# Patient Record
Sex: FEMALE | Race: WHITE | NOT HISPANIC OR LATINO | Employment: FULL TIME | URBAN - METROPOLITAN AREA
[De-identification: names, ages, dates, MRNs, and addresses within clinical notes are randomized per-mention and may not be internally consistent; named-entity substitution may affect disease eponyms.]

---

## 2020-09-21 ENCOUNTER — OFFICE VISIT (OUTPATIENT)
Dept: FAMILY MEDICINE CLINIC | Facility: CLINIC | Age: 35
End: 2020-09-21

## 2020-09-21 VITALS
TEMPERATURE: 98.2 F | OXYGEN SATURATION: 100 % | SYSTOLIC BLOOD PRESSURE: 128 MMHG | WEIGHT: 254 LBS | DIASTOLIC BLOOD PRESSURE: 74 MMHG | HEIGHT: 63 IN | BODY MASS INDEX: 45 KG/M2 | HEART RATE: 98 BPM

## 2020-09-21 DIAGNOSIS — M54.50 CHRONIC BILATERAL LOW BACK PAIN WITHOUT SCIATICA: ICD-10-CM

## 2020-09-21 DIAGNOSIS — M79.672 LEFT FOOT PAIN: Primary | ICD-10-CM

## 2020-09-21 DIAGNOSIS — G89.29 CHRONIC BILATERAL LOW BACK PAIN WITHOUT SCIATICA: ICD-10-CM

## 2020-09-21 DIAGNOSIS — G57.11 MERALGIA PARESTHETICA OF RIGHT SIDE: ICD-10-CM

## 2020-09-21 DIAGNOSIS — Z23 IMMUNIZATION DUE: ICD-10-CM

## 2020-09-21 PROBLEM — Z34.00 SUPERVISION OF NORMAL FIRST PREGNANCY, ANTEPARTUM: Status: ACTIVE | Noted: 2018-04-18

## 2020-09-21 PROBLEM — R10.9 LEFT FLANK PAIN: Status: ACTIVE | Noted: 2018-04-20

## 2020-09-21 PROCEDURE — 90471 IMMUNIZATION ADMIN: CPT | Performed by: NURSE PRACTITIONER

## 2020-09-21 PROCEDURE — 90686 IIV4 VACC NO PRSV 0.5 ML IM: CPT | Performed by: NURSE PRACTITIONER

## 2020-09-21 PROCEDURE — 99204 OFFICE O/P NEW MOD 45 MIN: CPT | Performed by: NURSE PRACTITIONER

## 2020-09-21 PROCEDURE — 73630 X-RAY EXAM OF FOOT: CPT | Performed by: NURSE PRACTITIONER

## 2020-09-21 RX ORDER — BUPROPION HYDROCHLORIDE 150 MG/1
150 TABLET ORAL DAILY
COMMUNITY
End: 2022-05-11

## 2020-09-21 NOTE — PROGRESS NOTES
"Subjective   Giuliana Titus is a 34 y.o. female who presents as a new patient to establish care. Had pain in left foot.    History of Present Illness   Daughter dropped object on foot in July. Typically not horrible, but at times has to step down carefully, to see if can bear weight  Also toe fungus. OTC not working.   Has been in funk since daughter was born. Just can't lose weight and unhappy with how looks. Did not have a lot of PPD as was on the look for this with history of depression. Gyn only for last 8-9 yrs. Used to work closely with her gyn  When pregnant RLE would go numb. Can't lay flat on back still and numbness some better.   Fiance and her are dieting and drinking no sodas. Just started new job with [employer] 3 months ago.  The following portions of the patient's history were reviewed and updated as appropriate: allergies, current medications, past family history, past medical history, past social history, past surgical history and problem list.    Review of Systems   Constitutional: Positive for activity change and unexpected weight gain. Negative for fatigue and unexpected weight loss.   Respiratory: Negative for cough.    Cardiovascular: Negative.    Musculoskeletal: Positive for arthralgias, back pain and gait problem. Negative for myalgias, neck stiffness and bursitis.   Skin: Positive for color change.   Allergic/Immunologic: Negative.    Neurological: Positive for numbness. Negative for weakness.   Hematological: Negative.    Psychiatric/Behavioral: Negative.      /74   Pulse 98   Temp 98.2 °F (36.8 °C) (Infrared)   Ht 158.8 cm (62.5\")   Wt 115 kg (254 lb)   LMP 09/07/2020 (Approximate)   SpO2 100%   Breastfeeding Yes   BMI 45.72 kg/m²     Objective   Physical Exam  Constitutional:       General: She is not in acute distress.     Appearance: She is well-developed.   Musculoskeletal:         General: No tenderness.      Lumbar back: She exhibits decreased range of motion, bony " tenderness (diffuse), pain and spasm (diffuse daniel LPS). She exhibits no swelling and no edema.      Comments: SLR daniel negative, Elizabeth negative daniel, negative piriformis to distraction rotation of spine reproduces numbness.   Skin:     Comments: 1st 2 toenails of both feet with thickening and splintering. No fungal changes to skin around toes   Neurological:      Sensory: No sensory deficit.      Motor: No abnormal muscle tone.      Deep Tendon Reflexes:      Reflex Scores:       Patellar reflexes are 2+ on the right side and 2+ on the left side.       Achilles reflexes are 2+ on the right side and 2+ on the left side.      Assessment/Plan   Problems Addressed this Visit     None      Visit Diagnoses     Left foot pain    -  Primary    Relevant Orders    XR Foot 3+ View Left    Immunization due        Relevant Orders    Fluarix Quad >6 Months ([id number]) (Completed)    Chronic bilateral low back pain without sciatica        Meralgia paresthetica of right side            Xray foot--NAD--no comparison. Will send to radiology for opinion. Likely bone bruise. Should settle in 2-3 more weeks. Continue NSAIDS and ice for relief  Update flu vaccine  LBP--doubt numbness associated and numbness pregnancy associated, anterior. Precedes LBP. Favor facet pathology--PT if desires HEP recommended  Meralgia paresthetica--injection if worrisome--would refer to pain management  Work on weight once more settled into new job

## 2021-04-16 ENCOUNTER — BULK ORDERING (OUTPATIENT)
Dept: CASE MANAGEMENT | Facility: OTHER | Age: 36
End: 2021-04-16

## 2021-04-16 DIAGNOSIS — Z23 IMMUNIZATION DUE: ICD-10-CM

## 2021-11-30 ENCOUNTER — TELEPHONE (OUTPATIENT)
Dept: FAMILY MEDICINE CLINIC | Facility: CLINIC | Age: 36
End: 2021-11-30

## 2021-11-30 DIAGNOSIS — U07.1 COVID-19 VIRUS DETECTED: Primary | ICD-10-CM

## 2021-11-30 LAB
EXPIRATION DATE: ABNORMAL
INTERNAL CONTROL: ABNORMAL
Lab: ABNORMAL
SARS-COV-2 AG UPPER RESP QL IA.RAPID: DETECTED

## 2021-11-30 PROCEDURE — 87426 SARSCOV CORONAVIRUS AG IA: CPT | Performed by: FAMILY MEDICINE

## 2021-11-30 RX ORDER — DEXTROMETHORPHAN HYDROBROMIDE AND PROMETHAZINE HYDROCHLORIDE 15; 6.25 MG/5ML; MG/5ML
5 SYRUP ORAL 4 TIMES DAILY PRN
Qty: 180 ML | Refills: 0 | Status: SHIPPED | OUTPATIENT
Start: 2021-11-30 | End: 2022-05-11

## 2021-11-30 RX ORDER — AZITHROMYCIN 250 MG/1
TABLET, FILM COATED ORAL
Qty: 6 TABLET | Refills: 0 | Status: SHIPPED | OUTPATIENT
Start: 2021-11-30 | End: 2022-05-11

## 2021-11-30 NOTE — TELEPHONE ENCOUNTER
Patient was notified. She does not want the infusion at this time. Onset of symptoms was 8 days ago.

## 2021-11-30 NOTE — TELEPHONE ENCOUNTER
I sent in meds.  I put in home test results.  If < 10 days from symptoms onset, could still get infusoin.  OK put in my name if still wants.  I sent in vitamin D3, zpack and promethazine cough syrup.     ----- Message from Francia Teresa MA sent at 11/29/2021  8:06 AM EST -----  Regarding: FW: Positive covid test  Tested positive for covid 4 days ago with at home test. Please advise.   ----- Message -----  From: Giuliana Titus  Sent: 11/27/2021  11:41 PM EST  To: Nelia Mckinney [facility]  Subject: Positive covid test                              Nevermind on the infusion question. I'm sure it's to late for that now. I did have one question though. Is there anyway I can get a prescription for a Z-pack? Covid has giving me a sinus infection that I would really like to get rid of. I normally just fight off sinus infections myself but I don't think I'm going to be able to get rid of this one without some help. Thanks!!!!    Giuliana Titus

## 2022-05-11 ENCOUNTER — OFFICE VISIT (OUTPATIENT)
Dept: FAMILY MEDICINE CLINIC | Facility: CLINIC | Age: 37
End: 2022-05-11

## 2022-05-11 VITALS
SYSTOLIC BLOOD PRESSURE: 130 MMHG | OXYGEN SATURATION: 98 % | DIASTOLIC BLOOD PRESSURE: 82 MMHG | HEIGHT: 63 IN | WEIGHT: 272 LBS | TEMPERATURE: 97.1 F | BODY MASS INDEX: 48.2 KG/M2 | HEART RATE: 89 BPM

## 2022-05-11 DIAGNOSIS — M53.3 SACROILIAC PAIN: ICD-10-CM

## 2022-05-11 DIAGNOSIS — E66.01 CLASS 3 SEVERE OBESITY DUE TO EXCESS CALORIES WITH SERIOUS COMORBIDITY AND BODY MASS INDEX (BMI) OF 45.0 TO 49.9 IN ADULT: Primary | ICD-10-CM

## 2022-05-11 PROCEDURE — 99214 OFFICE O/P EST MOD 30 MIN: CPT | Performed by: NURSE PRACTITIONER

## 2022-05-11 RX ORDER — FLUTICASONE PROPIONATE 50 MCG
SPRAY, SUSPENSION (ML) NASAL
COMMUNITY

## 2022-05-11 RX ORDER — PHENTERMINE HYDROCHLORIDE 37.5 MG/1
37.5 TABLET ORAL
Qty: 30 TABLET | Refills: 0 | Status: SHIPPED | OUTPATIENT
Start: 2022-05-11 | End: 2022-06-15 | Stop reason: SDUPTHER

## 2022-05-11 RX ORDER — ESCITALOPRAM OXALATE 10 MG/1
10 TABLET ORAL DAILY
COMMUNITY
Start: 2021-12-06

## 2022-05-11 RX ORDER — VITAMIN A ACETATE, BETA CAROTENE, ASCORBIC ACID, CHOLECALCIFEROL, .ALPHA.-TOCOPHEROL ACETATE, DL-, THIAMINE MONONITRATE, RIBOFLAVIN, NIACINAMIDE, PYRIDOXINE HYDROCHLORIDE, FOLIC ACID, CYANOCOBALAMIN, CALCIUM CARBONATE, FERROUS FUMARATE, ZINC OXIDE, CUPRIC OXIDE 3080; 12; 120; 400; 1; 1.84; 3; 20; 22; 920; 25; 200; 27; 10; 2 [IU]/1; UG/1; MG/1; [IU]/1; MG/1; MG/1; MG/1; MG/1; MG/1; [IU]/1; MG/1; MG/1; MG/1; MG/1; MG/1
TABLET, FILM COATED ORAL
COMMUNITY

## 2022-05-11 RX ORDER — VALACYCLOVIR HYDROCHLORIDE 1 G/1
1000 TABLET, FILM COATED ORAL DAILY
COMMUNITY
Start: 2022-02-16

## 2022-05-11 NOTE — PROGRESS NOTES
"Subjective   Giuliana Titus is a 36 y.o. female. Here for med refill and weight management.     History of Present Illness   Struggles with weight. Stress eats. lexapro started in Nov. Weight stable since started. Has tried dieting, weight watchers with mild loss. Did lose 30 lbs with phentermine. Eating less healthy since minna moved in. He likes to snack. Has done 1 meal/day with peanuts through the day and protein shake. Lost 10 with this  Depression after daughter's birth, now 3. Febrile seizures. See psychiatry. Now has a plan for dealing with seizures. Stress triggers eating.   L hip pain, difficulty getting in car or putting on pants. Throbbing pain. Taking ibuprofen at night.   Desiring another pregnancy, not currently preventing.   The following portions of the patient's history were reviewed and updated as appropriate: allergies, current medications, past family history, past medical history, past social history, past surgical history and problem list.    Review of Systems   Constitutional: Positive for activity change, appetite change and unexpected weight gain. Negative for fatigue, fever and unexpected weight loss.   Respiratory: Negative for shortness of breath.    Cardiovascular: Negative for chest pain and palpitations.   Musculoskeletal: Positive for arthralgias, back pain and bursitis.   Psychiatric/Behavioral: Negative for behavioral problems, decreased concentration, dysphoric mood and sleep disturbance. The patient is not nervous/anxious.      /82   Pulse 89   Temp 97.1 °F (36.2 °C) (Infrared)   Ht 158.8 cm (62.5\")   Wt 123 kg (272 lb)   LMP 04/24/2022   SpO2 98%   BMI 48.96 kg/m²     Objective   Physical Exam  Constitutional:       General: She is not in acute distress.     Appearance: She is well-developed. She is obese. She is not ill-appearing.   Musculoskeletal:      Lumbar back: Spasms, tenderness and bony tenderness present. No swelling or edema. Decreased range of motion. " Negative right straight leg raise test and negative left straight leg raise test.      Right hip: Tenderness (greater trochanter) present. No bony tenderness. Normal range of motion. Normal strength.      Left hip: Tenderness (greater trochanter) present. No bony tenderness. Normal range of motion. Decreased strength.      Comments: Elizabeth negative daniel, + L SI tenderness, negative piriformis to distraction   Neurological:      Mental Status: She is alert.      Sensory: No sensory deficit.      Motor: No abnormal muscle tone.       Assessment & Plan   Problems Addressed this Visit    None     Visit Diagnoses     Class 3 severe obesity due to excess calories with serious comorbidity and body mass index (BMI) of 45.0 to 49.9 in adult ([facility])    -  Primary    Relevant Medications    phentermine (ADIPEX-P) 37.5 MG tablet    Sacroiliac pain        Relevant Medications    phentermine (ADIPEX-P) 37.5 MG tablet      Diagnoses       Codes Comments    Class 3 severe obesity due to excess calories with serious comorbidity and body mass index (BMI) of 45.0 to 49.9 in adult ([facility])    -  Primary ICD-10-CM: E66.01, Z68.42  ICD-9-CM: 278.01, V85.42     Sacroiliac pain     ICD-10-CM: M53.3  ICD-9-CM: 724.6         SI pain--stretching HEP given, consider PT if not improving  Obese--start phentermine as tolerated well in past. Plans to start walking for exercise and add stair stepper. Plans to limit snacking at night. FU 1 month. Discussed for short term use only       Answers for HPI/ROS submitted by the patient on 5/9/2022  Please describe your symptoms.: Wanting to talk about weight loss. Also I am having increasingly worse hip pain that I may talk about.  Have you had these symptoms before?: No  How long have you been having these symptoms?: Greater than 2 weeks  Please list any medications you are currently taking for this condition.: None  Please describe any probable cause for these symptoms. : SI joint dysfunction  What is the  primary reason for your visit?: Other    [facility] Report:      As part of this patient's treatment plan, I am prescribing controlled substances. The patient has been made aware of appropriate use of such medications, including potential risk of somnolence, limited ability to drive and /or work safely, and potential for dependence or overdose. It has also been made clear that these medications are for use by this patient only, without concomitant use of alcohol or other substances unless prescribed.    [facility] report has been reviewed by: POWER Og on 05/12/22.  The report was not scanned into the patient's chart.    Date of last LORNA:  5/11/2022    This document is intended for medical expert use only. Reading of this document by patients and/or patient's family without participating medical staff guidance may result in misinterpretation and unintended morbidity.  Any interpretation of such data is the responsibility of the patient and/or family member responsible for the patient in concert with their primary or specialist providers, not to be left for sources of online searches such as [employer], [employer] or similar queries. Relying on these approaches to knowledge may result in misinterpretation, misguided goals of care and even death should patients or family members try recommendations outside of the realm of professional medical care in a supervised way.    Please allow 3-5 business days for recommendations based on new results    Go to the ER for any possible lifethreatening symptoms such as chest pain or shortness of air.

## 2022-06-15 DIAGNOSIS — M53.3 SACROILIAC PAIN: ICD-10-CM

## 2022-06-15 DIAGNOSIS — E66.01 CLASS 3 SEVERE OBESITY DUE TO EXCESS CALORIES WITH SERIOUS COMORBIDITY AND BODY MASS INDEX (BMI) OF 45.0 TO 49.9 IN ADULT: ICD-10-CM

## 2022-06-15 RX ORDER — PHENTERMINE HYDROCHLORIDE 37.5 MG/1
37.5 TABLET ORAL
Qty: 30 TABLET | Refills: 0 | Status: SHIPPED | OUTPATIENT
Start: 2022-06-15 | End: 2022-08-24 | Stop reason: SDUPTHER

## 2022-06-28 ENCOUNTER — OFFICE VISIT (OUTPATIENT)
Dept: FAMILY MEDICINE CLINIC | Facility: CLINIC | Age: 37
End: 2022-06-28

## 2022-06-28 VITALS
HEIGHT: 63 IN | HEART RATE: 87 BPM | WEIGHT: 262 LBS | OXYGEN SATURATION: 98 % | TEMPERATURE: 97.1 F | DIASTOLIC BLOOD PRESSURE: 74 MMHG | BODY MASS INDEX: 46.42 KG/M2 | SYSTOLIC BLOOD PRESSURE: 112 MMHG

## 2022-06-28 DIAGNOSIS — M53.3 SACROILIAC PAIN: ICD-10-CM

## 2022-06-28 DIAGNOSIS — E66.01 CLASS 3 SEVERE OBESITY DUE TO EXCESS CALORIES WITH SERIOUS COMORBIDITY AND BODY MASS INDEX (BMI) OF 45.0 TO 49.9 IN ADULT: Primary | ICD-10-CM

## 2022-06-28 PROCEDURE — 99213 OFFICE O/P EST LOW 20 MIN: CPT | Performed by: NURSE PRACTITIONER
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
## 2022-08-24 DIAGNOSIS — E66.01 CLASS 3 SEVERE OBESITY DUE TO EXCESS CALORIES WITH SERIOUS COMORBIDITY AND BODY MASS INDEX (BMI) OF 45.0 TO 49.9 IN ADULT: ICD-10-CM

## 2022-08-24 DIAGNOSIS — M53.3 SACROILIAC PAIN: ICD-10-CM

## 2022-08-24 RX ORDER — PHENTERMINE HYDROCHLORIDE 37.5 MG/1
37.5 TABLET ORAL
Qty: 30 TABLET | Refills: 0 | Status: SHIPPED | OUTPATIENT
Start: 2022-08-24 | End: 2022-12-05

## 2022-09-22 DIAGNOSIS — R10.9 FLANK PAIN: Primary | ICD-10-CM

## 2022-09-22 RX ORDER — CEPHALEXIN 500 MG/1
500 CAPSULE ORAL 2 TIMES DAILY
Qty: 14 CAPSULE | Refills: 0 | Status: SHIPPED | OUTPATIENT
Start: 2022-09-22 | End: 2022-09-30

## 2022-09-24 LAB
APPEARANCE UR: CLEAR
BACTERIA #/AREA URNS HPF: NORMAL /[HPF]
BILIRUB UR QL STRIP: NEGATIVE
CASTS URNS QL MICRO: NORMAL /LPF
COLOR UR: YELLOW
EPI CELLS #/AREA URNS HPF: NORMAL /HPF (ref 0–10)
GLUCOSE UR QL STRIP: NEGATIVE
HGB UR QL STRIP: NEGATIVE
KETONES UR QL STRIP: NEGATIVE
LEUKOCYTE ESTERASE UR QL STRIP: NEGATIVE
MICRO URNS: NORMAL
MICRO URNS: NORMAL
NITRITE UR QL STRIP: NEGATIVE
PH UR STRIP: 7 [PH] (ref 5–7.5)
PROT UR QL STRIP: NEGATIVE
RBC #/AREA URNS HPF: NORMAL /HPF (ref 0–2)
SP GR UR STRIP: 1.02 (ref 1–1.03)
UROBILINOGEN UR STRIP-MCNC: 0.2 MG/DL (ref 0.2–1)
WBC #/AREA URNS HPF: NORMAL /HPF (ref 0–5)

## 2022-09-30 ENCOUNTER — OFFICE VISIT (OUTPATIENT)
Dept: FAMILY MEDICINE CLINIC | Facility: CLINIC | Age: 37
End: 2022-09-30

## 2022-09-30 VITALS
DIASTOLIC BLOOD PRESSURE: 70 MMHG | WEIGHT: 261 LBS | HEART RATE: 78 BPM | BODY MASS INDEX: 48.03 KG/M2 | HEIGHT: 62 IN | RESPIRATION RATE: 18 BRPM | TEMPERATURE: 97.1 F | SYSTOLIC BLOOD PRESSURE: 122 MMHG | OXYGEN SATURATION: 98 %

## 2022-09-30 DIAGNOSIS — R53.83 OTHER FATIGUE: ICD-10-CM

## 2022-09-30 DIAGNOSIS — E28.2 PCOS (POLYCYSTIC OVARIAN SYNDROME): Primary | ICD-10-CM

## 2022-09-30 DIAGNOSIS — M75.51 SCAPULOTHORACIC BURSITIS OF RIGHT SHOULDER: ICD-10-CM

## 2022-09-30 DIAGNOSIS — E78.5 DYSLIPIDEMIA: ICD-10-CM

## 2022-09-30 DIAGNOSIS — N92.6 IRREGULAR PERIODS: ICD-10-CM

## 2022-09-30 DIAGNOSIS — E66.01 MORBID (SEVERE) OBESITY DUE TO EXCESS CALORIES: ICD-10-CM

## 2022-09-30 DIAGNOSIS — N20.0 RENAL STONE: ICD-10-CM

## 2022-09-30 PROCEDURE — 99214 OFFICE O/P EST MOD 30 MIN: CPT | Performed by: FAMILY MEDICINE

## 2022-09-30 RX ORDER — NAPROXEN 500 MG/1
500 TABLET ORAL 2 TIMES DAILY WITH MEALS
Qty: 30 TABLET | Refills: 0 | Status: SHIPPED | OUTPATIENT
Start: 2022-09-30 | End: 2022-10-18

## 2022-09-30 NOTE — PROGRESS NOTES
Subjective   Giuliana Titus is a 36 y.o. female. Presents today for   Chief Complaint   Patient presents with   • Follow-Up     3 month fup on medications    • Obesity     Answers for HPI/ROS submitted by the patient on 2022  Please describe your symptoms.: This is a follow up appointment for being on Phenermine., , I also am having right rotater cuff pain  Have you had these symptoms before?: No  How long have you been having these symptoms?: Greater than 2 weeks  Please list any medications you are currently taking for this condition.: Phenermine, , Nothing  Please describe any probable cause for these symptoms. : Nothing, , Lifting my daughter  What is the primary reason for your visit?: Other      History of Present Illness  Patient hre for weight loss;   Has been taking off/on;   Stress eater, getting  10/22/22;  Phentermine up sets stomach;  Reports after  wants have child.  Has PCOS, reports irregular no labs recently    Since seen last thinks passed another stone, had one come out size of eraser head;  Had one prior 2018 when pregnant;   CT noted on on left kidney;   Feels better, not had imaging since;  U/a after passed was negative.  Reviewed head imaging for thickened area of skull.  Review of Systems   Respiratory: Negative for shortness of breath.    Cardiovascular: Negative for chest pain and palpitations.   Gastrointestinal: Negative for abdominal pain, nausea and vomiting.       Patient Active Problem List   Diagnosis   • Abnormal uterine bleeding   •  delivery delivered   • Depression   • Left flank pain   • Supervision of normal first pregnancy, antepartum       Social History     Socioeconomic History   • Marital status:    Tobacco Use   • Smoking status: Never Smoker   • Smokeless tobacco: Never Used   Vaping Use   • Vaping Use: Never used   Substance and Sexual Activity   • Alcohol use: Not Currently   • Drug use: Never   • Sexual activity: Yes      "Partners: Male     Birth control/protection: None       No Known Allergies    Current Outpatient Medications on File Prior to Visit   Medication Sig Dispense Refill   • escitalopram (LEXAPRO) 10 MG tablet Take 10 mg by mouth Daily.     • fluticasone (FLONASE) 50 MCG/ACT nasal spray into the nostril(s) as directed by provider.     • phentermine (ADIPEX-P) 37.5 MG tablet Take 1 tablet by mouth Every Morning Before Breakfast. 30 tablet 0   • prenatal vitamins (PRENATAL 27-1) 27-1 MG tablet tablet Take  by mouth.     • valACYclovir (VALTREX) 1000 MG tablet Take 1,000 mg by mouth Daily.     • [DISCONTINUED] cephalexin (Keflex) 500 MG capsule Take 1 capsule by mouth 2 (Two) Times a Day. 14 capsule 0     No current facility-administered medications on file prior to visit.       Objective   Vitals:    09/30/22 1212   BP: 122/70   Pulse: 78   Resp: 18   Temp: 97.1 °F (36.2 °C)   TempSrc: Temporal   SpO2: 98%   Weight: 118 kg (261 lb)   Height: 157.5 cm (62\")   PainSc: 0-No pain     Body mass index is 47.74 kg/m².    Physical Exam  Vitals and nursing note reviewed.   Constitutional:       Appearance: Normal appearance. She is not toxic-appearing or diaphoretic.   HENT:      Head: Normocephalic and atraumatic.   Musculoskeletal:      Right shoulder: Tenderness present. Normal strength. Normal pulse.        Arms:       Cervical back: Neck supple.      Comments: Crepitus over shoulder blade  Spasm/pain over trapezius as well   Skin:     General: Skin is warm and dry.      Capillary Refill: Capillary refill takes less than 2 seconds.   Neurological:      Mental Status: She is alert.   Psychiatric:         Mood and Affect: Mood normal.         Behavior: Behavior normal.       Component      Latest Ref Rng & Units 9/23/2022 9/23/2022 9/23/2022           3:32 PM  3:32 PM  3:32 PM   Specific [place], UA      1.005 - 1.030  1.017    pH, UA      5.0 - 7.5  7.0    Color, UA      Yellow  Yellow    Appearance, UA      Clear  Clear  "   Leukocytes, UA      Negative  Negative    Protein, UA      Negative/Trace  Negative    Glucose      Negative  Negative    Ketones, UA      Negative  Negative    Blood, UA      Negative  Negative    Bilirubin, UA      Negative  Negative    Urobilinogen, UA      0.2 - 1.0 mg/dL  0.2    Nitrite, UA      Negative  Negative    Microscopic Examination        Comment See below:   WBC, UA      0 - 5 /hpf 0-5     RBC, UA      0 - 2 /hpf 0-2     Epithelial Cells (non renal)      0 - 10 /hpf 0-10     Casts      None seen /lpf None seen     Bacteria, UA      None seen/Few Few       Assessment & Plan   Diagnoses and all orders for this visit:    1. PCOS (polycystic ovarian syndrome) (Primary)  -     Comprehensive Metabolic Panel  -     Hemoglobin A1c  -     Insulin, Total  -     TSH  -     C-Peptide  -     DHEA-Sulfate  -     Estrogens, Total  -     Testosterone    2. Renal stone  -     CT Abdomen Pelvis Stone Protocol; Future    3. Morbid (severe) obesity due to excess calories (HCC)    4. Irregular periods  -     FSH & LH    5. Other fatigue  -     Comprehensive Metabolic Panel  -     TSH  -     CBC & Differential  -     Vitamin B12    6. Dyslipidemia  -     Lipid Panel    7. Scapulothoracic bursitis of right shoulder  -     naproxen (Naprosyn) 500 MG tablet; Take 1 tablet by mouth 2 (Two) Times a Day With Meals.  Dispense: 30 tablet; Refill: 0    counseled on diet and exercise for weight loss  Has pcos per patietn and irregular cycles;  D/w metformin to lessen insulin resistance and can help ovulate as will desire pregnancy once ;   If does become pregnant discussed holding meds and seeing ob/gyn right away;   Phentermine hold off as up setting stomach and desires pregnacy; discussed other meds as well contrave, saxenda and wegovy.  Will hold off until see lab work  Hx of low b12, will recheck and labs for aftigue  Shoulder pain - rotator cuff exam normal;  likley more bursitis and periscapular muscles, gave  exercises to do and will give short nsaid course to try  Passed stone,will check CT for further stones;  If several recommend see urology         -Follow up: 3 months and prn

## 2022-10-03 LAB
CONV .: NORMAL
Lab: NORMAL

## 2022-10-06 LAB
ALBUMIN SERPL-MCNC: 4.3 G/DL (ref 3.8–4.8)
ALBUMIN/GLOB SERPL: 1.9 {RATIO} (ref 1.2–2.2)
ALP SERPL-CCNC: 77 IU/L (ref 44–121)
ALT SERPL-CCNC: 20 IU/L (ref 0–32)
AST SERPL-CCNC: 20 IU/L (ref 0–40)
BASOPHILS # BLD AUTO: 0 X10E3/UL (ref 0–0.2)
BASOPHILS NFR BLD AUTO: 1 %
BILIRUB SERPL-MCNC: 0.3 MG/DL (ref 0–1.2)
BUN SERPL-MCNC: 9 MG/DL (ref 6–20)
BUN/CREAT SERPL: 14 (ref 9–23)
C PEPTIDE SERPL-MCNC: 3.2 NG/ML (ref 1.1–4.4)
CALCIUM SERPL-MCNC: 8.9 MG/DL (ref 8.7–10.2)
CHLORIDE SERPL-SCNC: 103 MMOL/L (ref 96–106)
CHOLEST SERPL-MCNC: 184 MG/DL (ref 100–199)
CO2 SERPL-SCNC: 22 MMOL/L (ref 20–29)
CREAT SERPL-MCNC: 0.66 MG/DL (ref 0.57–1)
DHEA-S SERPL-MCNC: 160 UG/DL (ref 57.3–279.2)
EGFRCR SERPLBLD CKD-EPI 2021: 115 ML/MIN/1.73
EOSINOPHIL # BLD AUTO: 0.1 X10E3/UL (ref 0–0.4)
EOSINOPHIL NFR BLD AUTO: 1 %
ERYTHROCYTE [DISTWIDTH] IN BLOOD BY AUTOMATED COUNT: 12.7 % (ref 11.7–15.4)
ESTROGEN SERPL-MCNC: 149 PG/ML
FSH SERPL-ACNC: 4 MIU/ML
GLOBULIN SER CALC-MCNC: 2.3 G/DL (ref 1.5–4.5)
GLUCOSE SERPL-MCNC: 87 MG/DL (ref 70–99)
HBA1C MFR BLD: 5.9 % (ref 4.8–5.6)
HCT VFR BLD AUTO: 41.3 % (ref 34–46.6)
HDLC SERPL-MCNC: 43 MG/DL
HGB BLD-MCNC: 13.8 G/DL (ref 11.1–15.9)
IMM GRANULOCYTES # BLD AUTO: 0 X10E3/UL (ref 0–0.1)
IMM GRANULOCYTES NFR BLD AUTO: 0 %
INSULIN SERPL-ACNC: 17 UIU/ML (ref 2.6–24.9)
LDLC SERPL CALC-MCNC: 117 MG/DL (ref 0–99)
LH SERPL-ACNC: 7 MIU/ML
LYMPHOCYTES # BLD AUTO: 2.3 X10E3/UL (ref 0.7–3.1)
LYMPHOCYTES NFR BLD AUTO: 37 %
MCH RBC QN AUTO: 29.7 PG (ref 26.6–33)
MCHC RBC AUTO-ENTMCNC: 33.4 G/DL (ref 31.5–35.7)
MCV RBC AUTO: 89 FL (ref 79–97)
MONOCYTES # BLD AUTO: 0.3 X10E3/UL (ref 0.1–0.9)
MONOCYTES NFR BLD AUTO: 5 %
NEUTROPHILS # BLD AUTO: 3.5 X10E3/UL (ref 1.4–7)
NEUTROPHILS NFR BLD AUTO: 56 %
PLATELET # BLD AUTO: 239 X10E3/UL (ref 150–450)
POTASSIUM SERPL-SCNC: 4.1 MMOL/L (ref 3.5–5.2)
PROT SERPL-MCNC: 6.6 G/DL (ref 6–8.5)
RBC # BLD AUTO: 4.64 X10E6/UL (ref 3.77–5.28)
SODIUM SERPL-SCNC: 140 MMOL/L (ref 134–144)
TESTOST SERPL-MCNC: 21 NG/DL (ref 8–60)
TRIGL SERPL-MCNC: 135 MG/DL (ref 0–149)
TSH SERPL DL<=0.005 MIU/L-ACNC: 1.32 UIU/ML (ref 0.45–4.5)
VIT B12 SERPL-MCNC: 381 PG/ML (ref 232–1245)
VLDLC SERPL CALC-MCNC: 24 MG/DL (ref 5–40)
WBC # BLD AUTO: 6.3 X10E3/UL (ref 3.4–10.8)

## 2022-10-18 DIAGNOSIS — E53.8 B12 DEFICIENCY: Primary | ICD-10-CM

## 2022-10-18 DIAGNOSIS — M75.51 SCAPULOTHORACIC BURSITIS OF RIGHT SHOULDER: ICD-10-CM

## 2022-10-18 RX ORDER — SYRINGE W-NEEDLE,DISPOSAB,3 ML 25GX5/8"
SYRINGE, EMPTY DISPOSABLE MISCELLANEOUS
Qty: 3 EACH | Refills: 1 | Status: SHIPPED | OUTPATIENT
Start: 2022-10-18

## 2022-10-18 RX ORDER — NAPROXEN 500 MG/1
TABLET ORAL
Qty: 30 TABLET | Refills: 0 | Status: SHIPPED | OUTPATIENT
Start: 2022-10-18 | End: 2022-10-31

## 2022-10-24 ENCOUNTER — TELEPHONE (OUTPATIENT)
Dept: FAMILY MEDICINE CLINIC | Facility: CLINIC | Age: 37
End: 2022-10-24

## 2022-10-24 DIAGNOSIS — N20.0 RENAL STONE: Primary | ICD-10-CM

## 2022-10-24 NOTE — TELEPHONE ENCOUNTER
Giuliana Tong scheduling called to let you know the CT Abdomen/Pelvis w/ Stone protocol was denied by insurance because they want an ultrasound first. Insurance is asking for either peer-to- peer or change order.

## 2022-10-29 DIAGNOSIS — M75.51 SCAPULOTHORACIC BURSITIS OF RIGHT SHOULDER: ICD-10-CM

## 2022-10-31 ENCOUNTER — APPOINTMENT (OUTPATIENT)
Dept: CT IMAGING | Facility: HOSPITAL | Age: 37
End: 2022-10-31

## 2022-10-31 ENCOUNTER — APPOINTMENT (OUTPATIENT)
Dept: ULTRASOUND IMAGING | Facility: HOSPITAL | Age: 37
End: 2022-10-31

## 2022-10-31 RX ORDER — NAPROXEN 500 MG/1
TABLET ORAL
Qty: 30 TABLET | Refills: 0 | Status: SHIPPED | OUTPATIENT
Start: 2022-10-31 | End: 2023-03-17

## 2022-11-17 DIAGNOSIS — E53.8 B12 DEFICIENCY: ICD-10-CM

## 2022-12-05 ENCOUNTER — OFFICE VISIT (OUTPATIENT)
Dept: FAMILY MEDICINE CLINIC | Facility: CLINIC | Age: 37
End: 2022-12-05

## 2022-12-05 VITALS
DIASTOLIC BLOOD PRESSURE: 70 MMHG | RESPIRATION RATE: 18 BRPM | HEART RATE: 92 BPM | SYSTOLIC BLOOD PRESSURE: 122 MMHG | HEIGHT: 62 IN | WEIGHT: 265 LBS | BODY MASS INDEX: 48.76 KG/M2 | OXYGEN SATURATION: 98 %

## 2022-12-05 DIAGNOSIS — Z23 NEED FOR INFLUENZA VACCINATION: ICD-10-CM

## 2022-12-05 DIAGNOSIS — R53.83 OTHER FATIGUE: Primary | ICD-10-CM

## 2022-12-05 PROCEDURE — 99213 OFFICE O/P EST LOW 20 MIN: CPT | Performed by: FAMILY MEDICINE

## 2022-12-05 PROCEDURE — 90471 IMMUNIZATION ADMIN: CPT | Performed by: FAMILY MEDICINE

## 2022-12-05 PROCEDURE — 90686 IIV4 VACC NO PRSV 0.5 ML IM: CPT | Performed by: FAMILY MEDICINE

## 2022-12-05 NOTE — PROGRESS NOTES
"Subjective   Giuliana Titus is a 37 y.o. female. Presents today for   Chief Complaint   Patient presents with   • Fatigue       History of Present Illness  Patient here for f/u of labs;  On b12 but not helping energy;  Reports snores, unrested in am;  Has not had sleep study;  Has avoided test due to costs as high deductible.    Review of Systems   Constitutional: Positive for fatigue.   Respiratory: Negative for shortness of breath.    Cardiovascular: Negative for chest pain and palpitations.       Patient Active Problem List   Diagnosis   • Abnormal uterine bleeding   •  delivery delivered   • Depression   • Left flank pain   • Supervision of normal first pregnancy, antepartum       Social History     Socioeconomic History   • Marital status:    Tobacco Use   • Smoking status: Never   • Smokeless tobacco: Never   Vaping Use   • Vaping Use: Never used   Substance and Sexual Activity   • Alcohol use: Not Currently   • Drug use: Never   • Sexual activity: Yes     Partners: Male     Birth control/protection: None       No Known Allergies    Current Outpatient Medications on File Prior to Visit   Medication Sig Dispense Refill   • Cyanocobalamin 1000 MCG/ML kit Inject 1,000 mcg as directed Every 30 (Thirty) Days. 1 kit 5   • escitalopram (LEXAPRO) 10 MG tablet Take 10 mg by mouth Daily.     • fluticasone (FLONASE) 50 MCG/ACT nasal spray into the nostril(s) as directed by provider.     • naproxen (NAPROSYN) 500 MG tablet TAKE ONE TABLET BY MOUTH TWICE A DAY WITH MEALS 30 tablet 0   • prenatal vitamins (PRENATAL 27-1) 27-1 MG tablet tablet Take  by mouth.     • Syringe 25G X 1\" 3 ML misc IM monthly 3 each 1   • valACYclovir (VALTREX) 1000 MG tablet Take 1,000 mg by mouth Daily.     • [DISCONTINUED] phentermine (ADIPEX-P) 37.5 MG tablet Take 1 tablet by mouth Every Morning Before Breakfast. 30 tablet 0     No current facility-administered medications on file prior to visit.       Objective   Vitals:    " "12/05/22 1705   BP: 122/70   Pulse: 92   Resp: 18   SpO2: 98%   Weight: 120 kg (265 lb)   Height: 157.5 cm (62\")   PainSc:   5   PainLoc: Shoulder  Comment: rt     Body mass index is 48.47 kg/m².    Physical Exam  Vitals and nursing note reviewed.   Constitutional:       Appearance: Normal appearance. She is not toxic-appearing or diaphoretic.   HENT:      Head: Normocephalic and atraumatic.   Musculoskeletal:      Cervical back: Neck supple.   Skin:     General: Skin is warm and dry.      Capillary Refill: Capillary refill takes less than 2 seconds.   Neurological:      Mental Status: She is alert.   Psychiatric:         Mood and Affect: Mood normal.         Behavior: Behavior normal.         Assessment & Plan   Diagnoses and all orders for this visit:    1. Other fatigue (Primary)    2. Need for influenza vaccination  -     FluLaval/Fluzone >6 mos (2401-1654)    sounds like maira, will order home sleep study and evaluate;  Continue b12 for now  Faxed orders       -Follow up: 3 months and prn   "

## 2022-12-16 DIAGNOSIS — U07.1 COVID-19 VIRUS DETECTED: Primary | ICD-10-CM

## 2023-03-17 ENCOUNTER — OFFICE VISIT (OUTPATIENT)
Dept: FAMILY MEDICINE CLINIC | Facility: CLINIC | Age: 38
End: 2023-03-17
Payer: COMMERCIAL

## 2023-03-17 ENCOUNTER — TELEPHONE (OUTPATIENT)
Dept: FAMILY MEDICINE CLINIC | Facility: CLINIC | Age: 38
End: 2023-03-17

## 2023-03-17 VITALS
OXYGEN SATURATION: 99 % | WEIGHT: 274 LBS | DIASTOLIC BLOOD PRESSURE: 76 MMHG | HEIGHT: 62 IN | HEART RATE: 94 BPM | SYSTOLIC BLOOD PRESSURE: 130 MMHG | BODY MASS INDEX: 50.42 KG/M2

## 2023-03-17 DIAGNOSIS — R03.0 PREHYPERTENSION: ICD-10-CM

## 2023-03-17 DIAGNOSIS — M54.12 CERVICAL RADICULOPATHY: Primary | ICD-10-CM

## 2023-03-17 DIAGNOSIS — E66.01 MORBID (SEVERE) OBESITY DUE TO EXCESS CALORIES: ICD-10-CM

## 2023-03-17 PROCEDURE — 99214 OFFICE O/P EST MOD 30 MIN: CPT | Performed by: FAMILY MEDICINE

## 2023-03-17 PROCEDURE — 96372 THER/PROPH/DIAG INJ SC/IM: CPT | Performed by: FAMILY MEDICINE

## 2023-03-17 RX ORDER — TIZANIDINE 4 MG/1
4 TABLET ORAL EVERY 8 HOURS PRN
Qty: 45 TABLET | Refills: 3 | Status: SHIPPED | OUTPATIENT
Start: 2023-03-17

## 2023-03-17 RX ORDER — DICLOFENAC SODIUM 75 MG/1
75 TABLET, DELAYED RELEASE ORAL 2 TIMES DAILY
Qty: 60 TABLET | Refills: 0 | Status: SHIPPED | OUTPATIENT
Start: 2023-03-17

## 2023-03-17 RX ORDER — TRIAMCINOLONE ACETONIDE 40 MG/ML
80 INJECTION, SUSPENSION INTRA-ARTICULAR; INTRAMUSCULAR ONCE
Status: COMPLETED | OUTPATIENT
Start: 2023-03-17 | End: 2023-03-17

## 2023-03-17 RX ADMIN — TRIAMCINOLONE ACETONIDE 80 MG: 40 INJECTION, SUSPENSION INTRA-ARTICULAR; INTRAMUSCULAR at 15:38

## 2023-03-17 NOTE — PROGRESS NOTES
Subjective   Giuliana Tiuts is a 37 y.o. female. Presents today for   Chief Complaint   Patient presents with   • Follow-up   • Fatigue   • Weight Loss     Answers for HPI/ROS submitted by the patient on 3/11/2023  Please describe your symptoms.: This is a follow up appointment but my shoulder pain has gotten worse and my BP has been elevated  Have you had these symptoms before?: No  How long have you been having these symptoms?: Greater than 2 weeks  Please list any medications you are currently taking for this condition.: Ibuprofen as needed  What is the primary reason for your visit?: Other      History of Present Illness  Patient 36 y/o female has ongoing neck pain radiating down arms right > left;   Hands occly tingling  Left new;  Right side for about 3 to 4 months;  Taking ibuprofen tiny relief;  Chronic fatigue, home sleep study normal, did while had covid 19.   Still fatigued;    bp has been high but sick;  150s, now 130s and trending down.        Review of Systems   Respiratory: Negative for shortness of breath.    Cardiovascular: Negative for chest pain and palpitations.   Gastrointestinal: Negative for abdominal pain.   Musculoskeletal: Positive for myalgias and neck pain.       Patient Active Problem List   Diagnosis   • Abnormal uterine bleeding   •  delivery delivered   • Depression   • Left flank pain   • Supervision of normal first pregnancy, antepartum       Social History     Socioeconomic History   • Marital status:    Tobacco Use   • Smoking status: Never   • Smokeless tobacco: Never   Vaping Use   • Vaping Use: Never used   Substance and Sexual Activity   • Alcohol use: Not Currently   • Drug use: Never   • Sexual activity: Yes     Partners: Male     Birth control/protection: None       No Known Allergies    Current Outpatient Medications on File Prior to Visit   Medication Sig Dispense Refill   • Cyanocobalamin 1000 MCG/ML kit Inject 1,000 mcg as directed Every 30 (Thirty)  "Days. 1 kit 5   • escitalopram (LEXAPRO) 10 MG tablet Take 1 tablet by mouth Daily.     • prenatal vitamins (PRENATAL 27-1) 27-1 MG tablet tablet Take  by mouth.     • Syringe 25G X 1\" 3 ML misc IM monthly 3 each 1   • fluticasone (FLONASE) 50 MCG/ACT nasal spray into the nostril(s) as directed by provider. (Patient not taking: Reported on 3/17/2023)     • valACYclovir (VALTREX) 1000 MG tablet Take 1,000 mg by mouth Daily. (Patient not taking: Reported on 3/17/2023)     • [DISCONTINUED] naproxen (NAPROSYN) 500 MG tablet TAKE ONE TABLET BY MOUTH TWICE A DAY WITH MEALS 30 tablet 0     No current facility-administered medications on file prior to visit.       Objective   Vitals:    03/17/23 1430   BP: 130/76   Pulse: 94   SpO2: 99%   Weight: 124 kg (274 lb)   Height: 157.5 cm (62.01\")     Body mass index is 50.1 kg/m².    Physical Exam  Vitals and nursing note reviewed.   Constitutional:       Appearance: She is well-developed.   Neck:      Comments: +spurling on right  Musculoskeletal:      Right hand: Normal strength. Normal strength of finger abduction, thumb/finger opposition and wrist extension.      Left hand: Normal strength. Normal strength of finger abduction, thumb/finger opposition and wrist extension.      Cervical back: Muscular tenderness present. No spinous process tenderness.   Skin:     General: Skin is warm and dry.   Neurological:      Mental Status: She is alert and oriented to person, place, and time.   Psychiatric:         Behavior: Behavior normal.         Assessment & Plan   Diagnoses and all orders for this visit:    1. Cervical radiculopathy (Primary)  -     triamcinolone acetonide (KENALOG-40) injection 80 mg  -     tiZANidine (ZANAFLEX) 4 MG tablet; Take 1 tablet by mouth Every 8 (Eight) Hours As Needed for Muscle Spasms.  Dispense: 45 tablet; Refill: 3  -     diclofenac (VOLTAREN) 75 MG EC tablet; Take 1 tablet by mouth 2 (Two) Times a Day.  Dispense: 60 tablet; Refill: 0  -     Ambulatory " Referral to Physical Therapy Evaluate and treat    2. Morbid (severe) obesity due to excess calories (HCC)  -     Semaglutide-Weight Management 0.5 MG/0.5ML solution auto-injector; Inject 0.5 mL under the skin into the appropriate area as directed 1 (One) Time Per Week. If tolerating call for next dose  Dispense: 2 mL; Refill: 2  -     Ambulatory Referral to Physical Therapy Evaluate and treat    3. Prehypertension    ? Radicular pain;  Will try steroid injection, nsaid and mr;  MRI if no relief;  Refer to PT, will send [facility] program to help with weight loss  Counseled on diet and exercise along with weight loss;  Gave wegovy samples and demonstrated first dose today;   Counseld on side effects and use at length  Will monitor bp  chornic fatigue - home sleep study negative;  D/w nuvigil or similar but will hold off as bp borderline;            -Follow up:  3 months and prn

## 2023-05-06 DIAGNOSIS — E53.8 B12 DEFICIENCY: ICD-10-CM

## 2023-05-08 RX ORDER — CYANOCOBALAMIN 1000 UG/ML
INJECTION, SOLUTION INTRAMUSCULAR; SUBCUTANEOUS
Qty: 1 ML | Refills: 1 | Status: SHIPPED | OUTPATIENT
Start: 2023-05-08

## 2023-05-11 DIAGNOSIS — E66.01 MORBID (SEVERE) OBESITY DUE TO EXCESS CALORIES: ICD-10-CM

## 2023-05-11 RX ORDER — SEMAGLUTIDE 1 MG/.5ML
1 INJECTION, SOLUTION SUBCUTANEOUS WEEKLY
Qty: 2 ML | Refills: 0 | Status: SHIPPED | OUTPATIENT
Start: 2023-05-11

## 2023-08-06 ENCOUNTER — HOSPITAL ENCOUNTER (OUTPATIENT)
Dept: MRI IMAGING | Facility: HOSPITAL | Age: 38
Discharge: HOME | End: 2023-08-06
Admitting: FAMILY MEDICINE
Payer: COMMERCIAL

## 2023-08-06 DIAGNOSIS — M54.12 CERVICAL RADICULOPATHY: ICD-10-CM

## 2023-08-06 PROCEDURE — 72141 MRI NECK SPINE W/O DYE: CPT

## 2023-08-08 DIAGNOSIS — M54.12 CERVICAL RADICULOPATHY: Primary | ICD-10-CM

## 2023-08-09 DIAGNOSIS — M54.12 CERVICAL RADICULOPATHY: ICD-10-CM

## 2023-08-09 RX ORDER — METHYLPREDNISOLONE 4 MG/1
TABLET ORAL
Qty: 21 TABLET | Refills: 0 | Status: SHIPPED | OUTPATIENT
Start: 2023-08-09

## 2023-08-09 NOTE — PROGRESS NOTES
Call results to patient.  MRI cervical spine back and notes disk bulge, no nerve impingement seen though doesn't completely rule out.  I recommend emg & ncv to investigate for nerve entrapment.  I placed order.  If still having pain, can try another medrol pack.

## 2023-08-23 ENCOUNTER — TELEPHONE (OUTPATIENT)
Dept: FAMILY MEDICINE CLINIC | Facility: CLINIC | Age: 38
End: 2023-08-23
Payer: COMMERCIAL

## 2023-08-24 NOTE — TELEPHONE ENCOUNTER
EMG and nerve conduction study was normal.   It did not show any nerve damage.  However this test is not 100% at picking up issues.   RRJ

## 2023-11-08 DIAGNOSIS — M54.12 CERVICAL RADICULOPATHY: ICD-10-CM

## 2023-11-08 RX ORDER — METHYLPREDNISOLONE 4 MG/1
TABLET ORAL
Qty: 21 TABLET | Refills: 0 | Status: SHIPPED | OUTPATIENT
Start: 2023-11-08

## 2023-11-12 DIAGNOSIS — M54.12 CERVICAL RADICULOPATHY: ICD-10-CM

## 2023-11-13 RX ORDER — DICLOFENAC SODIUM 75 MG/1
75 TABLET, DELAYED RELEASE ORAL 2 TIMES DAILY
Qty: 60 TABLET | Refills: 0 | Status: SHIPPED | OUTPATIENT
Start: 2023-11-13

## 2023-11-22 ENCOUNTER — TELEPHONE (OUTPATIENT)
Dept: FAMILY MEDICINE CLINIC | Facility: CLINIC | Age: 38
End: 2023-11-22
Payer: COMMERCIAL

## 2023-11-22 RX ORDER — PREDNISONE 10 MG/1
TABLET ORAL
Qty: 32 TABLET | Refills: 0 | Status: SHIPPED | OUTPATIENT
Start: 2023-11-22

## 2023-11-22 NOTE — TELEPHONE ENCOUNTER
----- Message from Janet Cancino MA sent at 11/20/2023  9:27 AM EST -----  Regarding: FW: Neck pain  Contact: [phone]    ----- Message -----  From: Giuliana Titus  Sent: 11/20/2023   9:13 AM EST  To: Nelia Mckinney [facility]  Subject: Neck pain                                        Good morning!    Unfortunately the dose pack didn't help much this time. Well actually it helped with the tingling sensation but not the pain. Also now with any activity I really set the pain off. Like the other day I was painting a little bit and that made my neck so angry. Then yesterday I was putting up [employer] decorations which involved no heavy lifting and it hasn't quit hurting since then. Like I couldn't get much sleep last night due to the pain. After about an hour or 2 of the pain it starts to radiate and I get a headache. Is there anyway to get like a steroid shot right into the area? Well not directly into my spine but the soft tissue around the area.   Thank you for reading!!

## 2023-11-29 ENCOUNTER — OFFICE VISIT (OUTPATIENT)
Dept: FAMILY MEDICINE CLINIC | Facility: CLINIC | Age: 38
End: 2023-11-29
Payer: COMMERCIAL

## 2023-11-29 VITALS
WEIGHT: 277.6 LBS | HEIGHT: 62 IN | DIASTOLIC BLOOD PRESSURE: 70 MMHG | HEART RATE: 75 BPM | OXYGEN SATURATION: 95 % | SYSTOLIC BLOOD PRESSURE: 132 MMHG | BODY MASS INDEX: 51.09 KG/M2

## 2023-11-29 DIAGNOSIS — M54.12 CERVICAL RADICULOPATHY: ICD-10-CM

## 2023-11-29 DIAGNOSIS — M54.81 BILATERAL OCCIPITAL NEURALGIA: ICD-10-CM

## 2023-11-29 DIAGNOSIS — M79.10 MYALGIA: ICD-10-CM

## 2023-11-29 DIAGNOSIS — R11.0 NAUSEA: Primary | ICD-10-CM

## 2023-11-29 DIAGNOSIS — M79.10 TRIGGER POINT: ICD-10-CM

## 2023-11-29 RX ORDER — LIDOCAINE HYDROCHLORIDE 20 MG/ML
2 INJECTION, SOLUTION INFILTRATION; PERINEURAL ONCE
Status: COMPLETED | OUTPATIENT
Start: 2023-11-29 | End: 2023-11-29

## 2023-11-29 RX ORDER — ONDANSETRON 4 MG/1
8 TABLET, ORALLY DISINTEGRATING ORAL ONCE
Status: DISCONTINUED | OUTPATIENT
Start: 2023-11-29 | End: 2023-11-29

## 2023-11-29 RX ORDER — ONDANSETRON 4 MG/1
8 TABLET, ORALLY DISINTEGRATING ORAL ONCE
Status: COMPLETED | OUTPATIENT
Start: 2023-11-29 | End: 2023-11-29

## 2023-11-29 RX ORDER — TRIAMCINOLONE ACETONIDE 40 MG/ML
40 INJECTION, SUSPENSION INTRA-ARTICULAR; INTRAMUSCULAR ONCE
Status: COMPLETED | OUTPATIENT
Start: 2023-11-29 | End: 2023-11-29

## 2023-11-29 RX ORDER — LETROZOLE 2.5 MG/1
TABLET, FILM COATED ORAL
COMMUNITY
Start: 2023-11-13

## 2023-11-29 RX ADMIN — ONDANSETRON 8 MG: 4 TABLET, ORALLY DISINTEGRATING ORAL at 12:18

## 2023-11-29 RX ADMIN — TRIAMCINOLONE ACETONIDE 40 MG: 40 INJECTION, SUSPENSION INTRA-ARTICULAR; INTRAMUSCULAR at 12:24

## 2023-11-29 RX ADMIN — LIDOCAINE HYDROCHLORIDE 2 ML: 20 INJECTION, SOLUTION INFILTRATION; PERINEURAL at 12:19

## 2023-11-29 RX ADMIN — TRIAMCINOLONE ACETONIDE 40 MG: 40 INJECTION, SUSPENSION INTRA-ARTICULAR; INTRAMUSCULAR at 12:22

## 2023-11-29 RX ADMIN — LIDOCAINE HYDROCHLORIDE 2 ML: 20 INJECTION, SOLUTION INFILTRATION; PERINEURAL at 12:21
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
## 2023-12-11 ENCOUNTER — TELEPHONE (OUTPATIENT)
Dept: FAMILY MEDICINE CLINIC | Facility: CLINIC | Age: 38
End: 2023-12-11
Payer: COMMERCIAL

## 2023-12-11 ENCOUNTER — OFFICE VISIT (OUTPATIENT)
Dept: FAMILY MEDICINE CLINIC | Facility: CLINIC | Age: 38
End: 2023-12-11
Payer: COMMERCIAL

## 2023-12-11 VITALS
SYSTOLIC BLOOD PRESSURE: 110 MMHG | WEIGHT: 274 LBS | OXYGEN SATURATION: 98 % | HEART RATE: 79 BPM | DIASTOLIC BLOOD PRESSURE: 86 MMHG | BODY MASS INDEX: 50.42 KG/M2 | HEIGHT: 62 IN

## 2023-12-11 DIAGNOSIS — J06.9 ACUTE URI: Primary | ICD-10-CM

## 2023-12-11 DIAGNOSIS — H66.003 NON-RECURRENT ACUTE SUPPURATIVE OTITIS MEDIA OF BOTH EARS WITHOUT SPONTANEOUS RUPTURE OF TYMPANIC MEMBRANES: ICD-10-CM

## 2023-12-11 PROCEDURE — 99213 OFFICE O/P EST LOW 20 MIN: CPT | Performed by: FAMILY MEDICINE

## 2023-12-11 RX ORDER — AMOXICILLIN 875 MG/1
875 TABLET, COATED ORAL 2 TIMES DAILY
Qty: 20 TABLET | Refills: 0 | Status: SHIPPED | OUTPATIENT
Start: 2023-12-11

## 2023-12-11 RX ORDER — DEXTROMETHORPHAN HYDROBROMIDE AND PROMETHAZINE HYDROCHLORIDE 15; 6.25 MG/5ML; MG/5ML
5 SYRUP ORAL 4 TIMES DAILY PRN
Qty: 180 ML | Refills: 0 | Status: SHIPPED | OUTPATIENT
Start: 2023-12-11

## 2023-12-11 NOTE — PROGRESS NOTES
Subjective   Giuliana Titus is a 38 y.o. female. Presents today for   Chief Complaint   Patient presents with    Cough     Sometimes productive    Sinusitis       Cough  This is a new problem. Episode onset: 2 weeks. Progression since onset: was getting better than sick again. The problem occurs constantly. Associated symptoms include nasal congestion, postnasal drip, shortness of breath and wheezing. Pertinent negatives include no chills, ear congestion, ear pain, fever or hemoptysis. She has tried OTC cough suppressant and prescription cough suppressant for the symptoms. The treatment provided mild relief. Daughter tested positive for RSV     Was to f/u for neck pain and occipital headaches next Monday;  reports doingmuch better and pain definitetly improved    Review of Systems   Constitutional:  Negative for chills and fever.   HENT:  Positive for postnasal drip. Negative for ear pain.    Respiratory:  Positive for cough, shortness of breath and wheezing. Negative for hemoptysis.        Patient Active Problem List   Diagnosis    Abnormal uterine bleeding     delivery delivered    Depression    Left flank pain    Supervision of normal first pregnancy, antepartum       Social History     Socioeconomic History    Marital status:    Tobacco Use    Smoking status: Never    Smokeless tobacco: Never   Vaping Use    Vaping Use: Never used   Substance and Sexual Activity    Alcohol use: Not Currently    Drug use: Never    Sexual activity: Yes     Partners: Male     Birth control/protection: None       No Known Allergies    Current Outpatient Medications on File Prior to Visit   Medication Sig Dispense Refill    cyanocobalamin 1000 MCG/ML injection INJECT 1 MILLILITER EVERY 30 DAYS AS DIRECTED 1 mL 1    diclofenac (VOLTAREN) 75 MG EC tablet TAKE ONE TABLET BY MOUTH TWICE A DAY 60 tablet 0    escitalopram (LEXAPRO) 10 MG tablet Take 1 tablet by mouth Daily.      fluticasone (FLONASE) 50 MCG/ACT nasal spray  "into the nostril(s) as directed by provider.      letrozole (FEMARA) 2.5 MG tablet TAKE 2 TABLETS BY MOUTH DAILY ON DAYS 3-7 OF CYCLE      prenatal vitamins (PRENATAL 27-1) 27-1 MG tablet tablet Take  by mouth.      Syringe 25G X 1\" 3 ML misc IM monthly 3 each 1    tiZANidine (ZANAFLEX) 4 MG tablet Take 1 tablet by mouth Every 8 (Eight) Hours As Needed for Muscle Spasms. 45 tablet 3    valACYclovir (VALTREX) 1000 MG tablet Take 1 tablet by mouth Daily.      phentermine (ADIPEX-P) 37.5 MG tablet Take 1 tablet by mouth Every Morning Before Breakfast. (Patient not taking: Reported on 12/11/2023) 30 tablet 2    [DISCONTINUED] predniSONE (DELTASONE) 10 MG tablet 6 tabs po qd x 2 days, 4 tabs po qd x 2 days, 3 tabs po qd x 2 days, 2 tabs po qd x 2 days, 1 tab po qd x 2 days (Patient not taking: Reported on 12/11/2023) 32 tablet 0    [DISCONTINUED] Semaglutide-Weight Management (Wegovy) 0.5 MG/0.5ML solution auto-injector Inject 0.5 mL under the skin into the appropriate area as directed 1 (One) Time Per Week. (Patient not taking: Reported on 12/11/2023) 6 mL 1     No current facility-administered medications on file prior to visit.       Objective   Vitals:    12/11/23 1255   BP: 110/86   Pulse: 79   SpO2: 98%   Weight: 124 kg (274 lb)   Height: 157.5 cm (62\")     Body mass index is 50.12 kg/m².    Physical Exam  Vitals and nursing note reviewed.   Constitutional:       Appearance: She is well-developed.   HENT:      Head: Normocephalic and atraumatic.      Right Ear: A middle ear effusion is present.      Left Ear: A middle ear effusion is present.      Ears:      Comments: Purulent effusion     Nose: Mucosal edema, congestion and rhinorrhea present.      Mouth/Throat:      Pharynx: No posterior oropharyngeal erythema.   Neck:      Thyroid: No thyromegaly.      Vascular: No JVD.   Cardiovascular:      Rate and Rhythm: Normal rate and regular rhythm.      Heart sounds: Normal heart sounds. No murmur heard.     No friction " rub. No gallop.   Pulmonary:      Effort: Pulmonary effort is normal. No respiratory distress.      Breath sounds: Normal breath sounds. No wheezing or rales.   Abdominal:      General: Bowel sounds are normal. There is no distension.      Palpations: Abdomen is soft.      Tenderness: There is no abdominal tenderness. There is no guarding or rebound.   Musculoskeletal:      Cervical back: Neck supple.   Skin:     General: Skin is warm and dry.   Neurological:      Mental Status: She is alert.   Psychiatric:         Behavior: Behavior normal.         Assessment & Plan   Diagnoses and all orders for this visit:    1. Acute URI (Primary)  -     promethazine-dextromethorphan (PROMETHAZINE-DM) 6.25-15 MG/5ML syrup; Take 5 mL by mouth 4 (Four) Times a Day As Needed for Cough.  Dispense: 180 mL; Refill: 0  -     Chlorcyclizine-Pseudoephed 25-60 MG tablet; 1 po tid prn congestion  Dispense: 42 tablet; Refill: 0    2. Non-recurrent acute suppurative otitis media of both ears without spontaneous rupture of tympanic membranes  -     amoxicillin (AMOXIL) 875 MG tablet; Take 1 tablet by mouth 2 (Two) Times a Day.  Dispense: 20 tablet; Refill: 0      Push fluids and rest  Meds as above     Cancel next Monday appt as doing much better after TP/occipital blocks          -Follow up: 6 months and Prn - RTC if worse or no improvement.

## 2023-12-11 NOTE — TELEPHONE ENCOUNTER
----- Message from Bindu Herrera MA sent at 12/11/2023  7:40 AM EST -----  Regarding: FW: Cough  Contact: [phone]    ----- Message -----  From: Giuliana Titus  Sent: 12/11/2023   2:50 AM EST  To: Nelia Mckinney [facility]  Subject: Cough                                            Good morning.   I'm supposed to see you next Monday but do not believe I can wait till then. So here is the back story my daughter got RSV(tested positive for) 11 days ago. I started showing symptoms of it about 8 days ago. We have let the virus run its course but I still have a productive cough and still get short of air super easily. When I do cough up phlegm it is yellow. I'm just questioning if I could ? Have pneumonia. It's hard for me to tell though because I can't listen to my own lungs very well and I have never had it before to compare to. Any advice would be greatly appreciated. Thank you for your time!!    Thank you,  Giuliana Chopra

## 2024-01-24 DIAGNOSIS — E53.8 B12 DEFICIENCY: ICD-10-CM

## 2024-01-25 RX ORDER — CYANOCOBALAMIN 1000 UG/ML
1000 INJECTION, SOLUTION INTRAMUSCULAR; SUBCUTANEOUS
Qty: 1 ML | Refills: 4 | Status: SHIPPED | OUTPATIENT
Start: 2024-01-25

## 2024-06-07 RX ORDER — PREDNISONE 10 MG/1
TABLET ORAL
Qty: 32 TABLET | Refills: 0 | Status: SHIPPED | OUTPATIENT
Start: 2024-06-07

## 2024-06-24 ENCOUNTER — OFFICE VISIT (OUTPATIENT)
Dept: FAMILY MEDICINE CLINIC | Facility: CLINIC | Age: 39
End: 2024-06-24
Payer: COMMERCIAL

## 2024-06-24 VITALS
OXYGEN SATURATION: 97 % | DIASTOLIC BLOOD PRESSURE: 86 MMHG | HEIGHT: 62 IN | BODY MASS INDEX: 50.79 KG/M2 | SYSTOLIC BLOOD PRESSURE: 128 MMHG | WEIGHT: 276 LBS | HEART RATE: 108 BPM

## 2024-06-24 DIAGNOSIS — E53.8 B12 DEFICIENCY: ICD-10-CM

## 2024-06-24 DIAGNOSIS — H65.92 LEFT OTITIS MEDIA WITH EFFUSION: ICD-10-CM

## 2024-06-24 DIAGNOSIS — M54.12 CERVICAL RADICULOPATHY: Primary | ICD-10-CM

## 2024-06-24 DIAGNOSIS — E78.5 DYSLIPIDEMIA: ICD-10-CM

## 2024-06-24 DIAGNOSIS — E61.1 IRON DEFICIENCY: ICD-10-CM

## 2024-06-24 DIAGNOSIS — R73.03 PREDIABETES: ICD-10-CM

## 2024-06-24 PROCEDURE — 96372 THER/PROPH/DIAG INJ SC/IM: CPT | Performed by: FAMILY MEDICINE

## 2024-06-24 PROCEDURE — 99214 OFFICE O/P EST MOD 30 MIN: CPT | Performed by: FAMILY MEDICINE

## 2024-06-24 RX ORDER — TRIAMCINOLONE ACETONIDE 40 MG/ML
80 INJECTION, SUSPENSION INTRA-ARTICULAR; INTRAMUSCULAR ONCE
Status: COMPLETED | OUTPATIENT
Start: 2024-06-24 | End: 2024-06-24

## 2024-06-24 RX ORDER — FAMOTIDINE 20 MG/1
20 TABLET, FILM COATED ORAL 2 TIMES DAILY
Qty: 30 TABLET | Refills: 0 | Status: SHIPPED | OUTPATIENT
Start: 2024-06-24

## 2024-06-24 RX ORDER — KETOROLAC TROMETHAMINE 30 MG/ML
60 INJECTION, SOLUTION INTRAMUSCULAR; INTRAVENOUS ONCE
Status: COMPLETED | OUTPATIENT
Start: 2024-06-24 | End: 2024-06-24

## 2024-06-24 RX ORDER — DICLOFENAC SODIUM 75 MG/1
75 TABLET, DELAYED RELEASE ORAL 2 TIMES DAILY
Qty: 30 TABLET | Refills: 0 | Status: SHIPPED | OUTPATIENT
Start: 2024-06-24

## 2024-06-24 RX ORDER — METHOCARBAMOL 750 MG/1
750 TABLET, FILM COATED ORAL NIGHTLY PRN
Qty: 30 TABLET | Refills: 3 | Status: SHIPPED | OUTPATIENT
Start: 2024-06-24

## 2024-06-24 RX ADMIN — KETOROLAC TROMETHAMINE 60 MG: 30 INJECTION, SOLUTION INTRAMUSCULAR; INTRAVENOUS at 17:38

## 2024-06-24 RX ADMIN — TRIAMCINOLONE ACETONIDE 80 MG: 40 INJECTION, SUSPENSION INTRA-ARTICULAR; INTRAMUSCULAR at 17:38

## 2024-06-24 NOTE — PROGRESS NOTES
"Subjective   Giuliana Titus is a 38 y.o. female. Presents today for   Chief Complaint   Patient presents with    Anxiety    Weight Check       History of Present Illness  Patient fatigued, light headed and Craving ice; no black or bloody stools; no abd pain;  Pain down right arm and neck pain again, steroid pack di dnot help    Left ear full, some allergies;  no fc;   Review of Systems   HENT:  Positive for congestion and ear pain.    Musculoskeletal:  Positive for myalgias and neck pain.   Allergic/Immunologic: Positive for environmental allergies.       Patient Active Problem List   Diagnosis    Abnormal uterine bleeding     delivery delivered    Depression    Left flank pain    Supervision of normal first pregnancy, antepartum       Social History     Socioeconomic History    Marital status:    Tobacco Use    Smoking status: Never    Smokeless tobacco: Never   Vaping Use    Vaping status: Never Used   Substance and Sexual Activity    Alcohol use: Not Currently    Drug use: Never    Sexual activity: Yes     Partners: Male     Birth control/protection: None       No Known Allergies    Current Outpatient Medications on File Prior to Visit   Medication Sig Dispense Refill    cyanocobalamin 1000 MCG/ML injection Inject 1 mL into the appropriate muscle as directed by prescriber Every 30 (Thirty) Days. 1 mL 4    escitalopram (LEXAPRO) 10 MG tablet Take 1 tablet by mouth Daily.      Syringe 25G X 1\" 3 ML misc IM monthly 3 each 1    valACYclovir (VALTREX) 1000 MG tablet Take 1 tablet by mouth Daily.      phentermine (ADIPEX-P) 37.5 MG tablet Take 1 tablet by mouth Every Morning Before Breakfast. (Patient not taking: Reported on 2023) 30 tablet 2    tiZANidine (ZANAFLEX) 4 MG tablet Take 1 tablet by mouth Every 8 (Eight) Hours As Needed for Muscle Spasms. (Patient not taking: Reported on 2024) 45 tablet 3    [DISCONTINUED] amoxicillin (AMOXIL) 875 MG tablet Take 1 tablet by mouth 2 (Two) Times a " "Day. 20 tablet 0    [DISCONTINUED] Chlorcyclizine-Pseudoephed 25-60 MG tablet 1 po tid prn congestion (Patient not taking: Reported on 6/24/2024) 42 tablet 0    [DISCONTINUED] diclofenac (VOLTAREN) 75 MG EC tablet TAKE ONE TABLET BY MOUTH TWICE A DAY (Patient not taking: Reported on 6/24/2024) 60 tablet 0    [DISCONTINUED] fluticasone (FLONASE) 50 MCG/ACT nasal spray into the nostril(s) as directed by provider. (Patient not taking: Reported on 6/24/2024)      [DISCONTINUED] letrozole (FEMARA) 2.5 MG tablet TAKE 2 TABLETS BY MOUTH DAILY ON DAYS 3-7 OF CYCLE      [DISCONTINUED] predniSONE (DELTASONE) 10 MG tablet 6 tabs po qd x 2 days, 4 tabs po qd x 2 days, 3 tabs po qd x 2 days, 2 tabs po qd x 2 days, 1 tab po qd x 2 days (Patient not taking: Reported on 6/24/2024) 32 tablet 0    [DISCONTINUED] prenatal vitamins (PRENATAL 27-1) 27-1 MG tablet tablet Take  by mouth. (Patient not taking: Reported on 6/24/2024)      [DISCONTINUED] promethazine-dextromethorphan (PROMETHAZINE-DM) 6.25-15 MG/5ML syrup Take 5 mL by mouth 4 (Four) Times a Day As Needed for Cough. (Patient not taking: Reported on 6/24/2024) 180 mL 0     No current facility-administered medications on file prior to visit.       Objective   Vitals:    06/24/24 1711   BP: 128/86   Pulse: 108   SpO2: 97%   Weight: 125 kg (276 lb)   Height: 157.5 cm (62\")     Body mass index is 50.48 kg/m².    Physical Exam  Vitals and nursing note reviewed.   Constitutional:       Appearance: She is well-developed.   HENT:      Head: Normocephalic and atraumatic.      Right Ear: Tympanic membrane normal.      Left Ear: A middle ear effusion is present.   Neck:      Thyroid: No thyromegaly.      Vascular: No JVD.   Cardiovascular:      Rate and Rhythm: Normal rate and regular rhythm.      Heart sounds: Normal heart sounds. No murmur heard.     No friction rub. No gallop.   Pulmonary:      Effort: Pulmonary effort is normal. No respiratory distress.      Breath sounds: Normal " breath sounds. No wheezing or rales.   Abdominal:      General: Bowel sounds are normal. There is no distension.      Palpations: Abdomen is soft.      Tenderness: There is no abdominal tenderness. There is no guarding or rebound.   Musculoskeletal:      Right hand: Normal strength. Normal strength of finger abduction, thumb/finger opposition and wrist extension.      Left hand: Normal strength. Normal strength of finger abduction, thumb/finger opposition and wrist extension.      Cervical back: Neck supple. Muscular tenderness present.   Skin:     General: Skin is warm and dry.   Neurological:      Mental Status: She is alert.   Psychiatric:         Behavior: Behavior normal.         Assessment & Plan   Diagnoses and all orders for this visit:    1. Cervical radiculopathy (Primary)  -     triamcinolone acetonide (KENALOG-40) injection 80 mg  -     ketorolac (TORADOL) injection 60 mg  -     diclofenac (VOLTAREN) 75 MG EC tablet; Take 1 tablet by mouth 2 (Two) Times a Day.  Dispense: 30 tablet; Refill: 0  -     famotidine (Pepcid) 20 MG tablet; Take 1 tablet by mouth 2 (Two) Times a Day.  Dispense: 30 tablet; Refill: 0  -     methocarbamol (ROBAXIN) 750 MG tablet; Take 1 tablet by mouth At Night As Needed for Muscle Spasms.  Dispense: 30 tablet; Refill: 3    2. Left otitis media with effusion    3. B12 deficiency  -     CBC (No Diff)    4. Prediabetes  -     Hemoglobin A1c    5. Dyslipidemia  -     Comprehensive Metabolic Panel  -     Lipid Panel    6. Iron deficiency  -     Iron Profile  -     Ferritin    Due dm2 screen and lipid check  OME - will see if steroid helps drain;  conitnue allergy meds  Radicular pain - will give IM tordol/kenalog;  start pepcid with nsaid and see if tolerates better (upset stomach a lot in past).   MR as needed  Symptoms of iron def and hx low b12, check for anemia                 -Follow up: 2 months and prn

## 2024-07-11 LAB
ALBUMIN SERPL-MCNC: 4 G/DL (ref 3.5–5.2)
ALBUMIN/GLOB SERPL: 2 G/DL
ALP SERPL-CCNC: 69 U/L (ref 39–117)
ALT SERPL-CCNC: 14 U/L (ref 1–33)
AST SERPL-CCNC: 12 U/L (ref 1–32)
BILIRUB SERPL-MCNC: 0.2 MG/DL (ref 0–1.2)
BUN SERPL-MCNC: 8 MG/DL (ref 6–20)
BUN/CREAT SERPL: 9.8 (ref 7–25)
CALCIUM SERPL-MCNC: 8.9 MG/DL (ref 8.6–10.5)
CHLORIDE SERPL-SCNC: 105 MMOL/L (ref 98–107)
CHOLEST SERPL-MCNC: 172 MG/DL (ref 0–200)
CO2 SERPL-SCNC: 23.8 MMOL/L (ref 22–29)
CREAT SERPL-MCNC: 0.82 MG/DL (ref 0.57–1)
EGFRCR SERPLBLD CKD-EPI 2021: 92.9 ML/MIN/1.73
ERYTHROCYTE [DISTWIDTH] IN BLOOD BY AUTOMATED COUNT: 14.9 % (ref 12.3–15.4)
FERRITIN SERPL-MCNC: 8.76 NG/ML (ref 13–150)
GLOBULIN SER CALC-MCNC: 2 GM/DL
GLUCOSE SERPL-MCNC: 105 MG/DL (ref 65–99)
HBA1C MFR BLD: 6.1 % (ref 4.8–5.6)
HCT VFR BLD AUTO: 34.1 % (ref 34–46.6)
HDLC SERPL-MCNC: 41 MG/DL (ref 40–60)
HGB BLD-MCNC: 10.9 G/DL (ref 12–15.9)
IRON SATN MFR SERPL: 5 % (ref 20–50)
IRON SERPL-MCNC: 25 MCG/DL (ref 37–145)
LDLC SERPL CALC-MCNC: 109 MG/DL (ref 0–100)
MCH RBC QN AUTO: 25 PG (ref 26.6–33)
MCHC RBC AUTO-ENTMCNC: 32 G/DL (ref 31.5–35.7)
MCV RBC AUTO: 78.2 FL (ref 79–97)
PLATELET # BLD AUTO: 283 10*3/MM3 (ref 140–450)
POTASSIUM SERPL-SCNC: 4 MMOL/L (ref 3.5–5.2)
PROT SERPL-MCNC: 6 G/DL (ref 6–8.5)
RBC # BLD AUTO: 4.36 10*6/MM3 (ref 3.77–5.28)
SODIUM SERPL-SCNC: 140 MMOL/L (ref 136–145)
TIBC SERPL-MCNC: 513 MCG/DL
TRIGL SERPL-MCNC: 124 MG/DL (ref 0–150)
UIBC SERPL-MCNC: 488 MCG/DL (ref 112–346)
VLDLC SERPL CALC-MCNC: 22 MG/DL (ref 5–40)
WBC # BLD AUTO: 4.86 10*3/MM3 (ref 3.4–10.8)

## 2024-07-15 RX ORDER — FERROUS GLUCONATE 324(38)MG
324 TABLET ORAL 2 TIMES DAILY WITH MEALS
Qty: 180 TABLET | Refills: 1 | Status: SHIPPED | OUTPATIENT
Start: 2024-07-15

## 2024-07-19 DIAGNOSIS — M54.12 CERVICAL RADICULOPATHY: ICD-10-CM

## 2024-07-19 RX ORDER — FAMOTIDINE 20 MG/1
20 TABLET, FILM COATED ORAL 2 TIMES DAILY
Qty: 30 TABLET | Refills: 0 | Status: SHIPPED | OUTPATIENT
Start: 2024-07-19

## 2024-07-22 ENCOUNTER — TELEPHONE (OUTPATIENT)
Dept: FAMILY MEDICINE CLINIC | Facility: CLINIC | Age: 39
End: 2024-07-22
Payer: COMMERCIAL

## 2024-07-22 DIAGNOSIS — N92.0 MENORRHAGIA WITH REGULAR CYCLE: Primary | ICD-10-CM

## 2024-07-22 DIAGNOSIS — E28.2 PCOS (POLYCYSTIC OVARIAN SYNDROME): ICD-10-CM

## 2024-07-22 DIAGNOSIS — R73.03 PREDIABETES: ICD-10-CM

## 2024-07-22 NOTE — TELEPHONE ENCOUNTER
----- Message from Bindu CONCEPCION sent at 7/15/2024  3:05 PM EDT -----  Yes to heavy periods but always has and no black tarry stools or blood in stool. She is aware of her results

## 2024-07-22 NOTE — TELEPHONE ENCOUNTER
Given enough bleeding to cause anemia, we should evaluate further.  Have stop in for fasting labs some morning and I also ordered a pelvic us to evaluate.   I placed all orders.  RRJ

## 2024-07-29 ENCOUNTER — CLINICAL SUPPORT (OUTPATIENT)
Dept: FAMILY MEDICINE CLINIC | Facility: CLINIC | Age: 39
End: 2024-07-29
Payer: COMMERCIAL

## 2024-07-29 DIAGNOSIS — K92.1 BLOOD IN STOOL: Primary | ICD-10-CM

## 2024-07-29 LAB
EXPIRATION DATE: NORMAL
GASTROCULT GAST QL: NEGATIVE
Lab: NORMAL

## 2024-07-29 PROCEDURE — 82274 ASSAY TEST FOR BLOOD FECAL: CPT | Performed by: FAMILY MEDICINE

## 2024-07-30 NOTE — PROGRESS NOTES
Call results to patient.  Hemoccult is negative.  Iron loss likely coming from menstrual cycle.   Proceed with US and take iron as directed.

## 2024-08-01 LAB
C PEPTIDE SERPL-MCNC: 3.7 NG/ML (ref 1.1–4.4)
DHEA-S SERPL-MCNC: 42.3 UG/DL (ref 57.3–279.2)
FSH SERPL-ACNC: 3.4 MIU/ML
INSULIN SERPL-ACNC: 18 UIU/ML (ref 2.6–24.9)
LH SERPL-ACNC: 3.2 MIU/ML
PROLACTIN SERPL-MCNC: 24.5 NG/ML (ref 4.8–33.4)
TESTOST FREE SERPL-MCNC: 0.3 PG/ML (ref 0–4.2)
TESTOST SERPL-MCNC: 11 NG/DL (ref 8–60)
TSH SERPL DL<=0.005 MIU/L-ACNC: 3.56 UIU/ML (ref 0.45–4.5)

## 2024-08-04 DIAGNOSIS — E27.40 ADRENAL INSUFFICIENCY: Primary | ICD-10-CM

## 2024-08-05 ENCOUNTER — OFFICE VISIT (OUTPATIENT)
Dept: FAMILY MEDICINE CLINIC | Facility: CLINIC | Age: 39
End: 2024-08-05
Payer: COMMERCIAL

## 2024-08-05 VITALS
OXYGEN SATURATION: 94 % | HEART RATE: 87 BPM | SYSTOLIC BLOOD PRESSURE: 122 MMHG | HEIGHT: 62 IN | DIASTOLIC BLOOD PRESSURE: 78 MMHG | BODY MASS INDEX: 50.61 KG/M2 | WEIGHT: 275 LBS

## 2024-08-05 DIAGNOSIS — Z00.00 WELLNESS EXAMINATION: Primary | ICD-10-CM

## 2024-08-05 PROCEDURE — 99396 PREV VISIT EST AGE 40-64: CPT | Performed by: FAMILY MEDICINE

## 2024-08-05 NOTE — PROGRESS NOTES
"Subjective   Giuliana Titus is a 38 y.o. female. Presents today for   Chief Complaint   Patient presents with    Neck Pain    Annual Exam       History of Present Illness  Patient 39 y/o female here for wellness exam; no cp/soa;  hsa chronic neck pain, doing ok at the moment.    Review of Systems   Respiratory:  Negative for shortness of breath.    Cardiovascular:  Negative for chest pain and palpitations.   Musculoskeletal:  Positive for neck pain.       Patient Active Problem List   Diagnosis    Abnormal uterine bleeding     delivery delivered    Depression    Left flank pain    Supervision of normal first pregnancy, antepartum       Social History     Socioeconomic History    Marital status:    Tobacco Use    Smoking status: Never    Smokeless tobacco: Never   Vaping Use    Vaping status: Never Used   Substance and Sexual Activity    Alcohol use: Not Currently    Drug use: Never    Sexual activity: Yes     Partners: Male     Birth control/protection: None       No Known Allergies    Current Outpatient Medications on File Prior to Visit   Medication Sig Dispense Refill    cyanocobalamin 1000 MCG/ML injection Inject 1 mL into the appropriate muscle as directed by prescriber Every 30 (Thirty) Days. 1 mL 4    diclofenac (VOLTAREN) 75 MG EC tablet Take 1 tablet by mouth 2 (Two) Times a Day. 30 tablet 0    escitalopram (LEXAPRO) 10 MG tablet Take 1 tablet by mouth Daily.      famotidine (PEPCID) 20 MG tablet TAKE 1 TABLET BY MOUTH 2 TIMES A DAY 30 tablet 0    ferrous gluconate (FERGON) 324 MG tablet Take 1 tablet by mouth 2 (Two) Times a Day With Meals. 180 tablet 1    methocarbamol (ROBAXIN) 750 MG tablet Take 1 tablet by mouth At Night As Needed for Muscle Spasms. 30 tablet 3    phentermine (ADIPEX-P) 37.5 MG tablet Take 1 tablet by mouth Every Morning Before Breakfast. 30 tablet 2    Syringe 25G X 1\" 3 ML misc IM monthly 3 each 1    valACYclovir (VALTREX) 1000 MG tablet Take 1 tablet by mouth Daily.  " "     No current facility-administered medications on file prior to visit.       Objective   Vitals:    08/05/24 1647 08/05/24 1718   BP: 130/88 122/78   Pulse: 87    SpO2: 94%    Weight: 125 kg (275 lb)    Height: 157.5 cm (62\")      Body mass index is 50.3 kg/m².    Physical Exam  Vitals and nursing note reviewed.   Constitutional:       Appearance: She is well-developed.   HENT:      Head: Normocephalic and atraumatic.   Neck:      Thyroid: No thyromegaly.      Vascular: No JVD.   Cardiovascular:      Rate and Rhythm: Normal rate and regular rhythm.      Heart sounds: Normal heart sounds. No murmur heard.     No friction rub. No gallop.   Pulmonary:      Effort: Pulmonary effort is normal. No respiratory distress.      Breath sounds: Normal breath sounds. No wheezing or rales.   Abdominal:      General: Bowel sounds are normal. There is no distension.      Palpations: Abdomen is soft.      Tenderness: There is no abdominal tenderness. There is no guarding or rebound.   Musculoskeletal:      Cervical back: Neck supple.   Skin:     General: Skin is warm and dry.   Neurological:      Mental Status: She is alert.      Gait: Gait normal.   Psychiatric:         Behavior: Behavior normal.         Assessment & Plan   Diagnoses and all orders for this visit:    1. Wellness examination (Primary)    Counseled on diet and exercise  Counseled on weight loss           Class 3 Severe Obesity (BMI >=40). Obesity-related health conditions include the following: none. Obesity is unchanged. BMI is is above average; BMI management plan is completed. We discussed portion control and increasing exercise.     -Follow up: 6 months and prn   Answers submitted by the patient for this visit:  Other (Submitted on 8/5/2024)  Please describe your symptoms.: Follow up for neck pain, anemia and ? Adrenal insufficiency  Have you had these symptoms before?: Yes  How long have you been having these symptoms?: Greater than 2 weeks  Please list any " medications you are currently taking for this condition.: Iron, Voltaren, muscle relaxer, pepcid  Primary Reason for Visit (Submitted on 8/5/2024)  What is the primary reason for your visit?: Other

## 2024-08-11 LAB
ACTH PLAS-MCNC: 10.8 PG/ML (ref 7.2–63.3)
ADRENAL AB TITR SER IF: NEGATIVE {TITER}
BUN SERPL-MCNC: 8 MG/DL (ref 6–24)
BUN/CREAT SERPL: 10 (ref 9–23)
CALCIUM SERPL-MCNC: 8.6 MG/DL (ref 8.7–10.2)
CHLORIDE SERPL-SCNC: 100 MMOL/L (ref 96–106)
CO2 SERPL-SCNC: 22 MMOL/L (ref 20–29)
CORTIS AM PEAK SERPL-MCNC: 6.4 UG/DL (ref 6.2–19.4)
CORTIS SAL-MCNC: 0.34 UG/DL
CREAT SERPL-MCNC: 0.8 MG/DL (ref 0.57–1)
DHEA-S SERPL-MCNC: 52 UG/DL (ref 57.3–279.2)
EGFRCR SERPLBLD CKD-EPI 2021: 95 ML/MIN/1.73
GLUCOSE SERPL-MCNC: 109 MG/DL (ref 70–99)
POTASSIUM SERPL-SCNC: 4.1 MMOL/L (ref 3.5–5.2)
SODIUM SERPL-SCNC: 136 MMOL/L (ref 134–144)

## 2024-08-18 NOTE — PATIENT INSTRUCTIONS
Calorie Counting for Weight Loss  Calories are units of energy. Your body needs a certain number of calories from food to keep going throughout the day. When you eat or drink more calories than your body needs, your body stores the extra calories mostly as fat. When you eat or drink fewer calories than your body needs, your body burns fat to get the energy it needs.  Calorie counting means keeping track of how many calories you eat and drink each day. Calorie counting can be helpful if you need to lose weight. If you eat fewer calories than your body needs, you should lose weight. Ask your health care provider what a healthy weight is for you.  For calorie counting to work, you will need to eat the right number of calories each day to lose a healthy amount of weight per week. A dietitian can help you figure out how many calories you need in a day and will suggest ways to reach your calorie goal.  A healthy amount of weight to lose each week is usually 1-2 lb (0.5-0.9 kg). This usually means that your daily calorie intake should be reduced by 500-750 calories.  Eating 1,200-1,500 calories a day can help most women lose weight.  Eating 1,500-1,800 calories a day can help most men lose weight.  What do I need to know about calorie counting?  Work with your health care provider or dietitian to determine how many calories you should get each day. To meet your daily calorie goal, you will need to:  Find out how many calories are in each food that you would like to eat. Try to do this before you eat.  Decide how much of the food you plan to eat.  Keep a food log. Do this by writing down what you ate and how many calories it had.  To successfully lose weight, it is important to balance calorie counting with a healthy lifestyle that includes regular activity.  Where do I find calorie information?  The number of calories in a food can be found on a Nutrition Facts label. If a food does not have a Nutrition Facts label, try to  look up the calories online or ask your dietitian for help.  Remember that calories are listed per serving. If you choose to have more than one serving of a food, you will have to multiply the calories per serving by the number of servings you plan to eat. For example, the label on a package of bread might say that a serving size is 1 slice and that there are 90 calories in a serving. If you eat 1 slice, you will have eaten 90 calories. If you eat 2 slices, you will have eaten 180 calories.  How do I keep a food log?  After each time that you eat, record the following in your food log as soon as possible:  What you ate. Be sure to include toppings, sauces, and other extras on the food.  How much you ate. This can be measured in cups, ounces, or number of items.  How many calories were in each food and drink.  The total number of calories in the food you ate.  Keep your food log near you, such as in a pocket-sized notebook or on an eitan or website on your mobile phone. Some programs will calculate calories for you and show you how many calories you have left to meet your daily goal.  What are some portion-control tips?  Know how many calories are in a serving. This will help you know how many servings you can have of a certain food.  Use a measuring cup to measure serving sizes. You could also try weighing out portions on a kitchen scale. With time, you will be able to estimate serving sizes for some foods.  Take time to put servings of different foods on your favorite plates or in your favorite bowls and cups so you know what a serving looks like.  Try not to eat straight from a food's packaging, such as from a bag or box. Eating straight from the package makes it hard to see how much you are eating and can lead to overeating. Put the amount you would like to eat in a cup or on a plate to make sure you are eating the right portion.  Use smaller plates, glasses, and bowls for smaller portions and to prevent  overeating.  Try not to multitask. For example, avoid watching TV or using your computer while eating. If it is time to eat, sit down at a table and enjoy your food. This will help you recognize when you are full. It will also help you be more mindful of what and how much you are eating.  What are tips for following this plan?  Reading food labels  Check the calorie count compared with the serving size. The serving size may be smaller than what you are used to eating.  Check the source of the calories. Try to choose foods that are high in protein, fiber, and vitamins, and low in saturated fat, trans fat, and sodium.  Shopping  Read nutrition labels while you shop. This will help you make healthy decisions about which foods to buy.  Pay attention to nutrition labels for low-fat or fat-free foods. These foods sometimes have the same number of calories or more calories than the full-fat versions. They also often have added sugar, starch, or salt to make up for flavor that was removed with the fat.  Make a grocery list of lower-calorie foods and stick to it.  Cooking  Try to cook your favorite foods in a healthier way. For example, try baking instead of frying.  Use low-fat dairy products.  Meal planning  Use more fruits and vegetables. One-half of your plate should be fruits and vegetables.  Include lean proteins, such as chicken, turkey, and fish.  Lifestyle  Each week, aim to do one of the followin minutes of moderate exercise, such as walking.  75 minutes of vigorous exercise, such as running.  General information  Know how many calories are in the foods you eat most often. This will help you calculate calorie counts faster.  Find a way of tracking calories that works for you. Get creative. Try different apps or programs if writing down calories does not work for you.  What foods should I eat?    Eat nutritious foods. It is better to have a nutritious, high-calorie food, such as an avocado, than a food with  few nutrients, such as a bag of potato chips.  Use your calories on foods and drinks that will fill you up and will not leave you hungry soon after eating.  Examples of foods that fill you up are nuts and nut butters, vegetables, lean proteins, and high-fiber foods such as whole grains. High-fiber foods are foods with more than 5 g of fiber per serving.  Pay attention to calories in drinks. Low-calorie drinks include water and unsweetened drinks.  The items listed above may not be a complete list of foods and beverages you can eat. Contact a dietitian for more information.  What foods should I limit?  Limit foods or drinks that are not good sources of vitamins, minerals, or protein or that are high in unhealthy fats. These include:  Candy.  Other sweets.  Sodas, specialty coffee drinks, alcohol, and juice.  The items listed above may not be a complete list of foods and beverages you should avoid. Contact a dietitian for more information.  How do I count calories when eating out?  Pay attention to portions. Often, portions are much larger when eating out. Try these tips to keep portions smaller:  Consider sharing a meal instead of getting your own.  If you get your own meal, eat only half of it. Before you start eating, ask for a container and put half of your meal into it.  When available, consider ordering smaller portions from the menu instead of full portions.  Pay attention to your food and drink choices. Knowing the way food is cooked and what is included with the meal can help you eat fewer calories.  If calories are listed on the menu, choose the lower-calorie options.  Choose dishes that include vegetables, fruits, whole grains, low-fat dairy products, and lean proteins.  Choose items that are boiled, broiled, grilled, or steamed. Avoid items that are buttered, battered, fried, or served with cream sauce. Items labeled as crispy are usually fried, unless stated otherwise.  Choose water, low-fat milk,  unsweetened iced tea, or other drinks without added sugar. If you want an alcoholic beverage, choose a lower-calorie option, such as a glass of wine or light beer.  Ask for dressings, sauces, and syrups on the side. These are usually high in calories, so you should limit the amount you eat.  If you want a salad, choose a garden salad and ask for grilled meats. Avoid extra toppings such as jarvis, cheese, or fried items. Ask for the dressing on the side, or ask for olive oil and vinegar or lemon to use as dressing.  Estimate how many servings of a food you are given. Knowing serving sizes will help you be aware of how much food you are eating at restaurants.  Where to find more information  Centers for Disease Control and Prevention: www.cdc.gov  [place] Department of Agriculture: myplate.gov  Summary  Calorie counting means keeping track of how many calories you eat and drink each day. If you eat fewer calories than your body needs, you should lose weight.  A healthy amount of weight to lose per week is usually 1-2 lb (0.5-0.9 kg). This usually means reducing your daily calorie intake by 500-750 calories.  The number of calories in a food can be found on a Nutrition Facts label. If a food does not have a Nutrition Facts label, try to look up the calories online or ask your dietitian for help.  Use smaller plates, glasses, and bowls for smaller portions and to prevent overeating.  Use your calories on foods and drinks that will fill you up and not leave you hungry shortly after a meal.  This information is not intended to replace advice given to you by your health care provider. Make sure you discuss any questions you have with your health care provider.  Document Revised: 01/28/2021 Document Reviewed: 01/28/2021  Elsevier Patient Education © 2022 [employer]    Exercising to Lose Weight  Getting regular exercise is important for everyone. It is especially important if you are overweight. Being overweight increases  your risk of heart disease, stroke, diabetes, high blood pressure, and several types of cancer. Exercising, and reducing the calories you consume, can help you lose weight and improve fitness and health.  Exercise can be moderate or vigorous intensity. To lose weight, most people need to do a certain amount of moderate or vigorous-intensity exercise each week.  How can exercise affect me?  You lose weight when you exercise enough to burn more calories than you eat. Exercise also reduces body fat and builds muscle. The more muscle you have, the more calories you burn. Exercise also:  Improves mood.  Reduces stress and tension.  Improves your overall fitness, flexibility, and endurance.  Increases bone strength.  Moderate-intensity exercise  Moderate-intensity exercise is any activity that gets you moving enough to burn at least three times more energy (calories) than if you were sitting.  Examples of moderate exercise include:  Walking a mile in 15 minutes.  Doing light yard work.  Biking at an easy pace.  Most people should get at least 150 minutes of moderate-intensity exercise a week to maintain their body weight.  Vigorous-intensity exercise  Vigorous-intensity exercise is any activity that gets you moving enough to burn at least six times more calories than if you were sitting. When you exercise at this intensity, you should be working hard enough that you are not able to carry on a conversation.  Examples of vigorous exercise include:  Running.  Playing a team sport, such as football, basketball, and soccer.  Jumping rope.  Most people should get at least 75 minutes a week of vigorous exercise to maintain their body weight.  What actions can I take to lose weight?  The amount of exercise you need to lose weight depends on:  Your age.  The type of exercise.  Any health conditions you have.  Your overall physical ability.  Talk to your health care provider about how much exercise you need and what types of  activities are safe for you.  Nutrition    Make changes to your diet as told by your health care provider or diet and nutrition specialist (dietitian). This may include:  Eating fewer calories.  Eating more protein.  Eating less unhealthy fats.  Eating a diet that includes fresh fruits and vegetables, whole grains, low-fat dairy products, and lean protein.  Avoiding foods with added fat, salt, and sugar.  Drink plenty of water while you exercise to prevent dehydration or heat stroke.  Activity  Choose an activity that you enjoy and set realistic goals. Your health care provider can help you make an exercise plan that works for you.  Exercise at a moderate or vigorous intensity most days of the week.  The intensity of exercise may vary from person to person. You can tell how intense a workout is for you by paying attention to your breathing and heartbeat. Most people will notice their breathing and heartbeat get faster with more intense exercise.  Do resistance training twice each week, such as:  Push-ups.  Sit-ups.  Lifting weights.  Using resistance bands.  Getting short amounts of exercise can be just as helpful as long, structured periods of exercise. If you have trouble finding time to exercise, try doing these things as part of your daily routine:  Get up, stretch, and walk around every 30 minutes throughout the day.  Go for a walk during your lunch break.  Park your car farther away from your destination.  If you take public transportation, get off one stop early and walk the rest of the way.  Make phone calls while standing up and walking around.  Take the stairs instead of elevators or escalators.  Wear comfortable clothes and shoes with good support.  Do not exercise so much that you hurt yourself, feel dizzy, or get very short of breath.  Where to find more information  [place] Department of Health and Human Services: www.hhs.gov  Centers for Disease Control and Prevention: www.cdc.gov  Contact a health care  provider:  Before starting a new exercise program.  If you have questions or concerns about your weight.  If you have a medical problem that keeps you from exercising.  Get help right away if:  You have any of the following while exercising:  Injury.  Dizziness.  Difficulty breathing or shortness of breath that does not go away when you stop exercising.  Chest pain.  Rapid heartbeat.  These symptoms may represent a serious problem that is an emergency. Do not wait to see if the symptoms will go away. Get medical help right away. Call your local emergency services (911 in the [place] Do not drive yourself to the hospital.  Summary  Getting regular exercise is especially important if you are overweight.  Being overweight increases your risk of heart disease, stroke, diabetes, high blood pressure, and several types of cancer.  Losing weight happens when you burn more calories than you eat.  Reducing the amount of calories you eat, and getting regular moderate or vigorous exercise each week, helps you lose weight.  This information is not intended to replace advice given to you by your health care provider. Make sure you discuss any questions you have with your health care provider.  Document Revised: 02/13/2022 Document Reviewed: 02/13/2022  Elsevier Patient Education © 2022 [employer]

## 2024-08-20 DIAGNOSIS — M54.12 CERVICAL RADICULOPATHY: ICD-10-CM

## 2024-08-20 RX ORDER — FAMOTIDINE 20 MG/1
20 TABLET, FILM COATED ORAL 2 TIMES DAILY
Qty: 60 TABLET | Refills: 3 | Status: SHIPPED | OUTPATIENT
Start: 2024-08-20

## 2024-09-17 ENCOUNTER — TELEPHONE (OUTPATIENT)
Dept: FAMILY MEDICINE CLINIC | Facility: CLINIC | Age: 39
End: 2024-09-17
Payer: COMMERCIAL

## 2024-09-17 RX ORDER — AMOXICILLIN 875 MG
875 TABLET ORAL 2 TIMES DAILY
Qty: 20 TABLET | Refills: 0 | Status: SHIPPED | OUTPATIENT
Start: 2024-09-17

## 2024-09-18 RX ORDER — ALBUTEROL SULFATE 90 UG/1
2 AEROSOL, METERED RESPIRATORY (INHALATION) EVERY 4 HOURS PRN
Qty: 6.7 G | Refills: 12 | Status: SHIPPED | OUTPATIENT
Start: 2024-09-18

## 2024-09-18 RX ORDER — METHYLPREDNISOLONE 4 MG
TABLET, DOSE PACK ORAL
Qty: 21 TABLET | Refills: 0 | Status: SHIPPED | OUTPATIENT
Start: 2024-09-18

## 2024-10-07 ENCOUNTER — OFFICE VISIT (OUTPATIENT)
Dept: FAMILY MEDICINE CLINIC | Facility: CLINIC | Age: 39
End: 2024-10-07
Payer: COMMERCIAL

## 2024-10-07 VITALS
HEART RATE: 76 BPM | WEIGHT: 273.4 LBS | HEIGHT: 62 IN | BODY MASS INDEX: 50.31 KG/M2 | OXYGEN SATURATION: 98 % | SYSTOLIC BLOOD PRESSURE: 168 MMHG | DIASTOLIC BLOOD PRESSURE: 100 MMHG | TEMPERATURE: 98.4 F

## 2024-10-07 DIAGNOSIS — E61.1 IRON DEFICIENCY: ICD-10-CM

## 2024-10-07 DIAGNOSIS — J20.9 ACUTE BRONCHITIS, UNSPECIFIED ORGANISM: Primary | ICD-10-CM

## 2024-10-07 DIAGNOSIS — N92.0 MENORRHAGIA WITH REGULAR CYCLE: Primary | ICD-10-CM

## 2024-10-07 PROBLEM — Z34.00 SUPERVISION OF NORMAL FIRST PREGNANCY, ANTEPARTUM: Status: RESOLVED | Noted: 2018-04-18 | Resolved: 2024-10-07

## 2024-10-07 PROCEDURE — 99213 OFFICE O/P EST LOW 20 MIN: CPT

## 2024-10-07 RX ORDER — DOXYCYCLINE 100 MG/1
100 CAPSULE ORAL 2 TIMES DAILY
Qty: 14 CAPSULE | Refills: 0 | Status: SHIPPED | OUTPATIENT
Start: 2024-10-07 | End: 2024-10-14

## 2024-10-07 RX ORDER — CODEINE PHOSPHATE/GUAIFENESIN 10-100MG/5
5 LIQUID (ML) ORAL 3 TIMES DAILY PRN
Qty: 180 ML | Refills: 0 | Status: SHIPPED | OUTPATIENT
Start: 2024-10-07

## 2024-10-07 RX ORDER — MEDROXYPROGESTERONE ACETATE 10 MG
10 TABLET ORAL DAILY
COMMUNITY
Start: 2024-08-20

## 2024-10-07 RX ORDER — BENZONATATE 100 MG/1
100 CAPSULE ORAL 3 TIMES DAILY PRN
Qty: 30 CAPSULE | Refills: 0 | Status: SHIPPED | OUTPATIENT
Start: 2024-10-07

## 2024-10-07 NOTE — PATIENT INSTRUCTIONS

## 2024-10-07 NOTE — PROGRESS NOTES
Subjective   Giuliana Titus is a 38 y.o. female.     Chief Complaint   Patient presents with    Cough     Sx x's about 8 days   No fever or Chills   Daughter passed symptoms along   Daughter tested Neg for Covid Rsv Flu and Strep     History of Present Illness     Acute bronchitis: Patient reports a day history of nasal congestion, dry and wet cough that keeps her up at night, chest soreness from coughing, shortness of air related to nasal congestion, decreased appetite, sinus pain, headache.  She was prescribed amoxicillin and a Medrol Dosepak last month for a different illness that did resolve.  States that her daughter has been sick recently with similar symptoms.  She states that her symptoms are gradually worsening.  He does have seasonal allergies, has not been taking antihistamine.  She has been using albuterol inhaler, Promethazine DM at night, Robitussin cold and flu, and nasal spray.    The following portions of the patient's history were reviewed and updated as appropriate: allergies, current medications, past family history, past medical history, past social history, past surgical history and problem list.    Review of Systems   Denies dizziness, fever/chills, blood in urine/stool, abd pain, leg swelling.    Objective     Vitals:    10/07/24 1617   BP: 168/100   Pulse: 76   Temp: 98.4 °F (36.9 °C)   SpO2: 98%      Body mass index is 50.01 kg/m².    Physical Exam  Vitals reviewed.   Constitutional:       General: She is not in acute distress.     Appearance: Normal appearance. She is obese. She is not ill-appearing or toxic-appearing.   HENT:      Right Ear: Tympanic membrane, ear canal and external ear normal. There is no impacted cerumen.      Left Ear: Tympanic membrane, ear canal and external ear normal. There is no impacted cerumen.      Nose: Congestion present. No rhinorrhea.      Mouth/Throat:      Mouth: Mucous membranes are moist.      Pharynx: Oropharynx is clear. No oropharyngeal exudate or  posterior oropharyngeal erythema.   Eyes:      Conjunctiva/sclera: Conjunctivae normal.   Cardiovascular:      Rate and Rhythm: Normal rate and regular rhythm.      Heart sounds: Normal heart sounds.   Pulmonary:      Effort: Pulmonary effort is normal. No respiratory distress.      Breath sounds: Examination of the left-lower field reveals rhonchi. Rhonchi present.   Lymphadenopathy:      Cervical: No cervical adenopathy.   Skin:     General: Skin is warm and dry.      Capillary Refill: Capillary refill takes less than 2 seconds.   Neurological:      General: No focal deficit present.      Mental Status: She is alert and oriented to person, place, and time.      Motor: No weakness.   Psychiatric:         Behavior: Behavior normal.       Assessment & Plan   Diagnoses and all orders for this visit:    1. Acute bronchitis, unspecified organism (Primary)  -     doxycycline (VIBRAMYCIN) 100 MG capsule; Take 1 capsule by mouth 2 (Two) Times a Day for 7 days.  Dispense: 14 capsule; Refill: 0  -     benzonatate (Tessalon Perles) 100 MG capsule; Take 1 capsule by mouth 3 (Three) Times a Day As Needed for Cough.  Dispense: 30 capsule; Refill: 0      Plan:     Take prescribed medications per instructions. Pended guafenesin-codeine to Dr. LANDA  Will order CBC to recheck r/t menorrhagia anemia.   Follow up if symptoms worsen or fail to improve    Discussed Care Gaps, ordered referrals and encouraged vaccination updates.       - Pt agrees with plan of care and denies further questions/concerns today  - This document is intended for medical expert use only. Persons  reading this document without medical staff guidance may result in misinterpretation and unintended morbidity     Go to the ER for any possible life-threatening symptoms such as chest pain or shortness of air.      Please allow 3-5 business days for recommendations based on new results      I personally spent time with this patient, preparing for the visit, reviewing  tests, obtaining and/or reviewing a separately obtained history, performing a medically appropriate examination and/or evaluation, counseling and educating the patient/family/caregiver, ordering medications,  documenting information in the medical record and indepentently interpreting results.

## 2024-10-08 ENCOUNTER — PATIENT ROUNDING (BHMG ONLY) (OUTPATIENT)
Dept: FAMILY MEDICINE CLINIC | Facility: CLINIC | Age: 39
End: 2024-10-08
Payer: COMMERCIAL

## 2024-10-08 NOTE — PROGRESS NOTES
A MobileTag message has been sent to the patient for PATIENT ROUNDING with [facility].

## 2024-10-11 DIAGNOSIS — E53.8 B12 DEFICIENCY: ICD-10-CM

## 2024-10-11 RX ORDER — CYANOCOBALAMIN 1000 UG/ML
INJECTION, SOLUTION INTRAMUSCULAR; SUBCUTANEOUS
Qty: 1 ML | Refills: 1 | Status: SHIPPED | OUTPATIENT
Start: 2024-10-11

## 2024-10-18 LAB
BASOPHILS # BLD AUTO: 0 X10E3/UL (ref 0–0.2)
BASOPHILS NFR BLD AUTO: 0 %
EOSINOPHIL # BLD AUTO: 0.1 X10E3/UL (ref 0–0.4)
EOSINOPHIL NFR BLD AUTO: 2 %
ERYTHROCYTE [DISTWIDTH] IN BLOOD BY AUTOMATED COUNT: 17 % (ref 11.7–15.4)
FERRITIN SERPL-MCNC: 31 NG/ML (ref 15–150)
HCT VFR BLD AUTO: 40 % (ref 34–46.6)
HGB BLD-MCNC: 12.8 G/DL (ref 11.1–15.9)
IMM GRANULOCYTES # BLD AUTO: 0 X10E3/UL (ref 0–0.1)
IMM GRANULOCYTES NFR BLD AUTO: 0 %
IRON SATN MFR SERPL: 13 % (ref 15–55)
IRON SERPL-MCNC: 42 UG/DL (ref 27–159)
LYMPHOCYTES # BLD AUTO: 2.2 X10E3/UL (ref 0.7–3.1)
LYMPHOCYTES NFR BLD AUTO: 35 %
MCH RBC QN AUTO: 27.7 PG (ref 26.6–33)
MCHC RBC AUTO-ENTMCNC: 32 G/DL (ref 31.5–35.7)
MCV RBC AUTO: 87 FL (ref 79–97)
MONOCYTES # BLD AUTO: 0.4 X10E3/UL (ref 0.1–0.9)
MONOCYTES NFR BLD AUTO: 6 %
NEUTROPHILS # BLD AUTO: 3.6 X10E3/UL (ref 1.4–7)
NEUTROPHILS NFR BLD AUTO: 57 %
PLATELET # BLD AUTO: 234 X10E3/UL (ref 150–450)
RBC # BLD AUTO: 4.62 X10E6/UL (ref 3.77–5.28)
TIBC SERPL-MCNC: 321 UG/DL (ref 250–450)
UIBC SERPL-MCNC: 279 UG/DL (ref 131–425)
WBC # BLD AUTO: 6.2 X10E3/UL (ref 3.4–10.8)

## 2024-11-18 ENCOUNTER — OFFICE VISIT (OUTPATIENT)
Dept: FAMILY MEDICINE CLINIC | Facility: CLINIC | Age: 39
End: 2024-11-18
Payer: COMMERCIAL

## 2024-11-18 VITALS
BODY MASS INDEX: 48.03 KG/M2 | WEIGHT: 261 LBS | HEART RATE: 87 BPM | SYSTOLIC BLOOD PRESSURE: 112 MMHG | HEIGHT: 62 IN | OXYGEN SATURATION: 100 % | DIASTOLIC BLOOD PRESSURE: 70 MMHG

## 2024-11-18 DIAGNOSIS — F41.1 GAD (GENERALIZED ANXIETY DISORDER): Primary | ICD-10-CM

## 2024-11-18 DIAGNOSIS — Z23 FLU VACCINE NEED: ICD-10-CM

## 2024-11-18 PROCEDURE — 90471 IMMUNIZATION ADMIN: CPT | Performed by: FAMILY MEDICINE

## 2024-11-18 PROCEDURE — 99213 OFFICE O/P EST LOW 20 MIN: CPT | Performed by: FAMILY MEDICINE

## 2024-11-18 PROCEDURE — 90656 IIV3 VACC NO PRSV 0.5 ML IM: CPT | Performed by: FAMILY MEDICINE

## 2024-11-18 RX ORDER — MEGESTROL ACETATE 20 MG/1
20 TABLET ORAL 2 TIMES DAILY
COMMUNITY
Start: 2024-11-18 | End: 2024-11-18

## 2024-11-18 NOTE — PROGRESS NOTES
"Subjective   Giuliana iTtus is a 39 y.o. female. Presents today for   Chief Complaint   Patient presents with    Anxiety    aub       History of Present Illness  Patient 40 y/o female with aub, seeing gyn and saw today for endometrial bx, us and labs (pending results);  Lost some weight but was sick after uti and lost weight;   Is back on iron after uti and cbc last check better;  MEG doing ok on lexapro.     Review of Systems   Cardiovascular:  Negative for chest pain.   Gastrointestinal:  Negative for abdominal pain, nausea and vomiting.   Genitourinary:  Negative for difficulty urinating, frequency and urgency.   Psychiatric/Behavioral:  The patient is nervous/anxious.        Patient Active Problem List   Diagnosis    Abnormal uterine bleeding    Depression    Left flank pain       Social History     Socioeconomic History    Marital status:    Tobacco Use    Smoking status: Never     Passive exposure: Never    Smokeless tobacco: Never   Vaping Use    Vaping status: Never Used   Substance and Sexual Activity    Alcohol use: Not Currently    Drug use: Never    Sexual activity: Yes     Partners: Male     Birth control/protection: None       No Known Allergies    Current Outpatient Medications on File Prior to Visit   Medication Sig Dispense Refill    albuterol sulfate  (90 Base) MCG/ACT inhaler Inhale 2 puffs Every 4 (Four) Hours As Needed for Wheezing or Shortness of Air. 6.7 g 12    cyanocobalamin 1000 MCG/ML injection INJECT 1 MILLILITER INTRAMUSCULARLY EVERY 30 DAYS AS DIRECTED 1 mL 1    escitalopram (LEXAPRO) 10 MG tablet Take 1 tablet by mouth Daily.      famotidine (PEPCID) 20 MG tablet TAKE 1 TABLET BY MOUTH 2 TIMES A DAY 60 tablet 3    ferrous gluconate (FERGON) 324 MG tablet Take 1 tablet by mouth 2 (Two) Times a Day With Meals. 180 tablet 1    Syringe 25G X 1\" 3 ML misc IM monthly 3 each 1    valACYclovir (VALTREX) 1000 MG tablet Take 1 tablet by mouth Daily.      [DISCONTINUED] " "medroxyPROGESTERone (PROVERA) 10 MG tablet Take 1 tablet by mouth Daily.      [DISCONTINUED] benzonatate (Tessalon Perles) 100 MG capsule Take 1 capsule by mouth 3 (Three) Times a Day As Needed for Cough. 30 capsule 0    [DISCONTINUED] guaiFENesin-codeine (GUAIFENESIN AC) 100-10 MG/5ML liquid Take 5 mL by mouth 3 (Three) Times a Day As Needed for Cough. 180 mL 0    [DISCONTINUED] megestrol (MEGACE) 20 MG tablet Take 1 tablet by mouth 2 (Two) Times a Day. (Patient not taking: Reported on 11/18/2024)       No current facility-administered medications on file prior to visit.       Objective   Vitals:    11/18/24 1731   BP: 112/70   Pulse: 87   SpO2: 100%   Weight: 118 kg (261 lb)   Height: 157.5 cm (62\")     Body mass index is 47.74 kg/m².    Physical Exam  Vitals and nursing note reviewed.   Constitutional:       Appearance: She is well-developed.   HENT:      Head: Normocephalic and atraumatic.   Neck:      Thyroid: No thyromegaly.      Vascular: No JVD.   Cardiovascular:      Rate and Rhythm: Normal rate and regular rhythm.      Heart sounds: Normal heart sounds. No murmur heard.     No friction rub. No gallop.   Pulmonary:      Effort: Pulmonary effort is normal. No respiratory distress.      Breath sounds: Normal breath sounds. No wheezing or rales.   Abdominal:      General: Bowel sounds are normal. There is no distension.      Palpations: Abdomen is soft.      Tenderness: There is no abdominal tenderness. There is no guarding or rebound.   Musculoskeletal:      Cervical back: Neck supple.   Skin:     General: Skin is warm and dry.   Neurological:      Mental Status: She is alert.      Gait: Gait normal.   Psychiatric:         Behavior: Behavior normal.         Assessment & Plan   Diagnoses and all orders for this visit:    1. MEG (generalized anxiety disorder) (Primary)    2. Flu vaccine need  -     Fluzone >6mos      Continue lexapro               -Follow up: 4 months an dprn   Answers submitted by the patient " for this visit:  Other (Submitted on 11/17/2024)  Please describe your symptoms.: Follow up from low hemoglobin  Have you had these symptoms before?: Yes  How long have you been having these symptoms?: Greater than 2 weeks  Please list any medications you are currently taking for this condition.: Iron  Please describe any probable cause for these symptoms. : Heaving menses and menses everyday since 8/4/24  Primary Reason for Visit (Submitted on 11/17/2024)  What is the primary reason for your visit?: Problem Not Listed

## 2024-12-13 DIAGNOSIS — E53.8 B12 DEFICIENCY: ICD-10-CM

## 2024-12-13 DIAGNOSIS — M54.12 CERVICAL RADICULOPATHY: ICD-10-CM

## 2024-12-13 RX ORDER — CYANOCOBALAMIN 1000 UG/ML
INJECTION, SOLUTION INTRAMUSCULAR; SUBCUTANEOUS
Qty: 1 ML | Refills: 1 | Status: SHIPPED | OUTPATIENT
Start: 2024-12-13

## 2024-12-13 RX ORDER — FAMOTIDINE 20 MG/1
20 TABLET, FILM COATED ORAL 2 TIMES DAILY
Qty: 60 TABLET | Refills: 3 | Status: SHIPPED | OUTPATIENT
Start: 2024-12-13

## 2024-12-17 ENCOUNTER — OFFICE VISIT (OUTPATIENT)
Dept: FAMILY MEDICINE CLINIC | Facility: CLINIC | Age: 39
End: 2024-12-17
Payer: COMMERCIAL

## 2024-12-17 VITALS
BODY MASS INDEX: 47.59 KG/M2 | SYSTOLIC BLOOD PRESSURE: 116 MMHG | HEART RATE: 94 BPM | TEMPERATURE: 97.5 F | OXYGEN SATURATION: 97 % | WEIGHT: 258.6 LBS | HEIGHT: 62 IN | DIASTOLIC BLOOD PRESSURE: 80 MMHG

## 2024-12-17 DIAGNOSIS — R05.1 ACUTE COUGH: Primary | ICD-10-CM

## 2024-12-17 PROCEDURE — 99213 OFFICE O/P EST LOW 20 MIN: CPT | Performed by: NURSE PRACTITIONER

## 2024-12-17 RX ORDER — METHYLPREDNISOLONE 4 MG/1
TABLET ORAL
Qty: 21 TABLET | Refills: 0 | Status: SHIPPED | OUTPATIENT
Start: 2024-12-17

## 2024-12-17 RX ORDER — MEGESTROL ACETATE 20 MG/1
TABLET ORAL
COMMUNITY
Start: 2024-11-18

## 2024-12-17 RX ORDER — PREDNISONE 10 MG/1
TABLET ORAL
Qty: 32 TABLET | Refills: 0 | Status: SHIPPED | OUTPATIENT
Start: 2024-12-17

## 2024-12-17 RX ORDER — DEXTROMETHORPHAN HYDROBROMIDE AND PROMETHAZINE HYDROCHLORIDE 15; 6.25 MG/5ML; MG/5ML
5 SYRUP ORAL
Qty: 240 ML | Refills: 0 | Status: SHIPPED | OUTPATIENT
Start: 2024-12-17

## 2024-12-17 NOTE — PROGRESS NOTES
Subjective   Giuliana Titus is a 39 y.o. female. Presents today for   Chief Complaint   Patient presents with   • Cough     Lingering Cough  seems like getting worse     Becoming SOA easily       History Of Present Illness Giuliana Titus is a 39-year-old female presenting today with a persistent cough    History of Present Illness  The patient presents for evaluation of a persistent cough.    She has been experiencing this cough for approximately 2 weeks, which she attributes to a viral infection contracted from her daughter. The cough is non-productive and has been progressively worsening. She reports no associated shortness of breath. She describes a constant rattling sensation in her throat. She has not been prescribed any antibiotics for this condition. She was previously provided with a rescue inhaler during her last visit. She has been self-medicating with Chloraseptic, but her supply was exhausted the previous night.    Supplemental Information  She also mentions that she is unable to elevate her head due to neck pain caused by a bulging disc, which results in difficulty breathing when lying flat.    MEDICATIONS  Current: Chloraseptic    Patient Active Problem List   Diagnosis   • Abnormal uterine bleeding   • Depression   • Left flank pain       Social History     Socioeconomic History   • Marital status:    Tobacco Use   • Smoking status: Never     Passive exposure: Never   • Smokeless tobacco: Never   Vaping Use   • Vaping status: Never Used   Substance and Sexual Activity   • Alcohol use: Not Currently   • Drug use: Never   • Sexual activity: Yes     Partners: Male     Birth control/protection: None       No Known Allergies    Current Outpatient Medications on File Prior to Visit   Medication Sig Dispense Refill   • albuterol sulfate  (90 Base) MCG/ACT inhaler Inhale 2 puffs Every 4 (Four) Hours As Needed for Wheezing or Shortness of Air. 6.7 g 12   • cyanocobalamin 1000 MCG/ML injection  "INJECT 1 MILLILITER INTRAMUSCULARLY EVERY 30 DAYS AS DIRECTED 1 mL 1   • escitalopram (LEXAPRO) 10 MG tablet Take 1 tablet by mouth Daily.     • famotidine (PEPCID) 20 MG tablet TAKE 1 TABLET BY MOUTH 2 TIMES A DAY 60 tablet 3   • ferrous gluconate (FERGON) 324 MG tablet Take 1 tablet by mouth 2 (Two) Times a Day With Meals. 180 tablet 1   • megestrol (MEGACE) 20 MG tablet      • Syringe 25G X 1\" 3 ML misc IM monthly 3 each 1   • valACYclovir (VALTREX) 1000 MG tablet Take 1 tablet by mouth Daily.       No current facility-administered medications on file prior to visit.       Objective   Vitals:    12/17/24 1039   BP: 116/80   Pulse: 94   Temp: 97.5 °F (36.4 °C)   SpO2: 97%   Weight: 117 kg (258 lb 9.6 oz)   Height: 157.5 cm (62\")     Body mass index is 47.3 kg/m².    Physical Exam  Lungs are clear.  Physical Exam  Constitutional:       Appearance: Normal appearance.   HENT:      Head: Normocephalic and atraumatic.      Nose: Nose normal.      Mouth/Throat:      Mouth: Mucous membranes are moist.   Eyes:      Pupils: Pupils are equal, round, and reactive to light.   Cardiovascular:      Rate and Rhythm: Normal rate and regular rhythm.      Pulses: Normal pulses.      Heart sounds: Normal heart sounds.   Pulmonary:      Effort: Pulmonary effort is normal.      Breath sounds: Normal breath sounds.   Abdominal:      General: Bowel sounds are normal.   Musculoskeletal:         General: Normal range of motion.      Cervical back: Normal range of motion.   Skin:     General: Skin is warm and dry.      Capillary Refill: Capillary refill takes less than 2 seconds.   Neurological:      General: No focal deficit present.      Mental Status: She is alert.   Psychiatric:         Mood and Affect: Mood normal.     Results         Procedures     Assessment & Plan   Diagnoses and all orders for this visit:    1. Acute cough (Primary)  -     predniSONE (DELTASONE) 10 MG tablet; 6 tabs po qd x 2 days, 4 tabs po qd x 2 days, 3 tabs po " qd x 2 days, 2 tabs po qd x 2 days, 1 tab po qd x 2 days  Dispense: 32 tablet; Refill: 0    Other orders  -     promethazine-dextromethorphan (PROMETHAZINE-DM) 6.25-15 MG/5ML syrup; Take 5 mL by mouth every night at bedtime.  Dispense: 240 mL; Refill: 0         Assessment & Plan  1. Cough.  Her lungs are clear upon examination, indicating that the cough is likely due to postnasal drainage. A prescription for promethazine with dextromethorphan will be provided, to be taken at bedtime to alleviate the cough and facilitate rest. Additionally, a 10-day tapering course of steroids will be initiated to manage the symptoms. She is advised to continue coughing during the day to expel any mucus.                 No follow-ups on file.     Patient or patient representative verbalized consent for the use of Ambient Listening during the visit with  [employer] for chart documentation. 12/17/2024  10:54 EST

## 2024-12-23 RX ORDER — AZITHROMYCIN 250 MG/1
TABLET, FILM COATED ORAL
Qty: 6 TABLET | Refills: 0 | Status: SHIPPED | OUTPATIENT
Start: 2024-12-23

## 2025-02-12 DIAGNOSIS — E53.8 B12 DEFICIENCY: ICD-10-CM

## 2025-02-12 RX ORDER — CYANOCOBALAMIN 1000 UG/ML
INJECTION, SOLUTION INTRAMUSCULAR; SUBCUTANEOUS
Qty: 1 ML | Refills: 1 | Status: SHIPPED | OUTPATIENT
Start: 2025-02-12

## 2025-03-07 ENCOUNTER — OFFICE VISIT (OUTPATIENT)
Dept: FAMILY MEDICINE CLINIC | Facility: CLINIC | Age: 40
End: 2025-03-07
Payer: COMMERCIAL

## 2025-03-07 VITALS
HEART RATE: 88 BPM | SYSTOLIC BLOOD PRESSURE: 116 MMHG | BODY MASS INDEX: 50.05 KG/M2 | OXYGEN SATURATION: 97 % | DIASTOLIC BLOOD PRESSURE: 82 MMHG | WEIGHT: 272 LBS | HEIGHT: 62 IN

## 2025-03-07 DIAGNOSIS — M54.12 CERVICAL RADICULOPATHY: Primary | ICD-10-CM

## 2025-03-07 DIAGNOSIS — N93.9 ABNORMAL UTERINE BLEEDING (AUB): ICD-10-CM

## 2025-03-07 PROCEDURE — 99214 OFFICE O/P EST MOD 30 MIN: CPT | Performed by: FAMILY MEDICINE

## 2025-03-07 RX ORDER — ACETAMINOPHEN AND CODEINE PHOSPHATE 120; 12 MG/5ML; MG/5ML
0.35 SOLUTION ORAL DAILY
COMMUNITY
Start: 2025-02-12 | End: 2026-02-12

## 2025-03-07 RX ORDER — MEDROXYPROGESTERONE ACETATE 10 MG
TABLET ORAL
COMMUNITY
Start: 2025-01-11 | End: 2025-03-07

## 2025-03-07 RX ORDER — PREDNISONE 10 MG/1
TABLET ORAL
Qty: 32 TABLET | Refills: 0 | Status: SHIPPED | OUTPATIENT
Start: 2025-03-07

## 2025-03-07 RX ORDER — ONDANSETRON 8 MG/1
8 TABLET, FILM COATED ORAL
COMMUNITY
Start: 2025-02-27
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
## 2025-04-13 DIAGNOSIS — M54.12 CERVICAL RADICULOPATHY: ICD-10-CM

## 2025-04-13 RX ORDER — FAMOTIDINE 20 MG/1
20 TABLET, FILM COATED ORAL 2 TIMES DAILY
Qty: 60 TABLET | Refills: 3 | Status: SHIPPED | OUTPATIENT
Start: 2025-04-13

## 2025-04-14 ENCOUNTER — OFFICE VISIT (OUTPATIENT)
Dept: FAMILY MEDICINE CLINIC | Facility: CLINIC | Age: 40
End: 2025-04-14
Payer: COMMERCIAL

## 2025-04-14 VITALS
BODY MASS INDEX: 51.34 KG/M2 | WEIGHT: 279 LBS | SYSTOLIC BLOOD PRESSURE: 116 MMHG | DIASTOLIC BLOOD PRESSURE: 88 MMHG | OXYGEN SATURATION: 96 % | HEART RATE: 97 BPM | HEIGHT: 62 IN

## 2025-04-14 DIAGNOSIS — M79.10 MYALGIA: ICD-10-CM

## 2025-04-14 DIAGNOSIS — N93.9 ABNORMAL UTERINE BLEEDING (AUB): ICD-10-CM

## 2025-04-14 DIAGNOSIS — M79.10 TRIGGER POINT: ICD-10-CM

## 2025-04-14 DIAGNOSIS — M54.12 CERVICAL RADICULOPATHY: Primary | ICD-10-CM

## 2025-04-14 DIAGNOSIS — R73.03 PREDIABETES: ICD-10-CM

## 2025-04-14 DIAGNOSIS — B35.1 ONYCHOMYCOSIS: ICD-10-CM

## 2025-04-14 DIAGNOSIS — E78.5 DYSLIPIDEMIA: ICD-10-CM

## 2025-04-14 DIAGNOSIS — D50.0 IRON DEFICIENCY ANEMIA DUE TO CHRONIC BLOOD LOSS: ICD-10-CM

## 2025-04-14 RX ORDER — CICLOPIROX 80 MG/ML
SOLUTION TOPICAL NIGHTLY
Qty: 6 ML | Refills: 12 | Status: SHIPPED | OUTPATIENT
Start: 2025-04-14

## 2025-04-14 RX ORDER — TRIAMCINOLONE ACETONIDE 40 MG/ML
40 INJECTION, SUSPENSION INTRA-ARTICULAR; INTRAMUSCULAR ONCE
Status: DISCONTINUED | OUTPATIENT
Start: 2025-04-14 | End: 2025-04-14

## 2025-04-14 RX ORDER — KETOROLAC TROMETHAMINE 30 MG/ML
60 INJECTION, SOLUTION INTRAMUSCULAR; INTRAVENOUS ONCE
Status: DISCONTINUED | OUTPATIENT
Start: 2025-04-14 | End: 2025-04-14

## 2025-04-14 RX ORDER — DICLOFENAC SODIUM 75 MG/1
75 TABLET, DELAYED RELEASE ORAL 2 TIMES DAILY
Qty: 60 TABLET | Refills: 0 | Status: SHIPPED | OUTPATIENT
Start: 2025-04-14

## 2025-04-14 RX ORDER — LIDOCAINE HYDROCHLORIDE 20 MG/ML
2 INJECTION, SOLUTION INFILTRATION; PERINEURAL ONCE
Status: COMPLETED | OUTPATIENT
Start: 2025-04-14 | End: 2025-04-14

## 2025-04-14 RX ORDER — TRIAMCINOLONE ACETONIDE 40 MG/ML
40 INJECTION, SUSPENSION INTRA-ARTICULAR; INTRAMUSCULAR ONCE
Status: COMPLETED | OUTPATIENT
Start: 2025-04-14 | End: 2025-04-14

## 2025-04-14 RX ADMIN — TRIAMCINOLONE ACETONIDE 40 MG: 40 INJECTION, SUSPENSION INTRA-ARTICULAR; INTRAMUSCULAR at 17:37

## 2025-04-14 RX ADMIN — LIDOCAINE HYDROCHLORIDE 2 ML: 20 INJECTION, SOLUTION INFILTRATION; PERINEURAL at 17:37
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
## 2025-04-16 DIAGNOSIS — E53.8 B12 DEFICIENCY: ICD-10-CM

## 2025-04-16 RX ORDER — CYANOCOBALAMIN 1000 UG/ML
INJECTION, SOLUTION INTRAMUSCULAR; SUBCUTANEOUS
Qty: 1 ML | Refills: 3 | Status: SHIPPED | OUTPATIENT
Start: 2025-04-16

## 2025-05-29 LAB
ALBUMIN SERPL-MCNC: 4.1 G/DL (ref 3.9–4.9)
ALP SERPL-CCNC: 78 IU/L (ref 44–121)
ALT SERPL-CCNC: 12 IU/L (ref 0–32)
AST SERPL-CCNC: 14 IU/L (ref 0–40)
BILIRUB SERPL-MCNC: 0.3 MG/DL (ref 0–1.2)
BUN SERPL-MCNC: 9 MG/DL (ref 6–24)
BUN/CREAT SERPL: 10 (ref 9–23)
CALCIUM SERPL-MCNC: 9.2 MG/DL (ref 8.7–10.2)
CHLORIDE SERPL-SCNC: 104 MMOL/L (ref 96–106)
CHOLEST SERPL-MCNC: 190 MG/DL (ref 100–199)
CO2 SERPL-SCNC: 21 MMOL/L (ref 20–29)
CREAT SERPL-MCNC: 0.89 MG/DL (ref 0.57–1)
EGFRCR SERPLBLD CKD-EPI 2021: 83 ML/MIN/1.73
ERYTHROCYTE [DISTWIDTH] IN BLOOD BY AUTOMATED COUNT: 13.6 % (ref 11.7–15.4)
FERRITIN SERPL-MCNC: 24 NG/ML (ref 15–150)
GLOBULIN SER CALC-MCNC: 2 G/DL (ref 1.5–4.5)
GLUCOSE SERPL-MCNC: 133 MG/DL (ref 70–99)
HBA1C MFR BLD: 5.9 % (ref 4.8–5.6)
HCT VFR BLD AUTO: 45.4 % (ref 34–46.6)
HDLC SERPL-MCNC: 42 MG/DL
HGB BLD-MCNC: 14.9 G/DL (ref 11.1–15.9)
IRON SATN MFR SERPL: 17 % (ref 15–55)
IRON SERPL-MCNC: 63 UG/DL (ref 27–159)
LDLC SERPL CALC-MCNC: 117 MG/DL (ref 0–99)
MCH RBC QN AUTO: 29.9 PG (ref 26.6–33)
MCHC RBC AUTO-ENTMCNC: 32.8 G/DL (ref 31.5–35.7)
MCV RBC AUTO: 91 FL (ref 79–97)
PLATELET # BLD AUTO: 256 X10E3/UL (ref 150–450)
POTASSIUM SERPL-SCNC: 4 MMOL/L (ref 3.5–5.2)
PROT SERPL-MCNC: 6.1 G/DL (ref 6–8.5)
RBC # BLD AUTO: 4.99 X10E6/UL (ref 3.77–5.28)
SODIUM SERPL-SCNC: 141 MMOL/L (ref 134–144)
TIBC SERPL-MCNC: 362 UG/DL (ref 250–450)
TRIGL SERPL-MCNC: 174 MG/DL (ref 0–149)
UIBC SERPL-MCNC: 299 UG/DL (ref 131–425)
VLDLC SERPL CALC-MCNC: 31 MG/DL (ref 5–40)
WBC # BLD AUTO: 6.5 X10E3/UL (ref 3.4–10.8)

## 2025-06-01 ENCOUNTER — RESULTS FOLLOW-UP (OUTPATIENT)
Dept: FAMILY MEDICINE CLINIC | Facility: CLINIC | Age: 40
End: 2025-06-01
Payer: COMMERCIAL

## 2025-06-01 NOTE — LETTER
Giulianascott Titus  [address] [facility] [address]    June 2, 2025     Dear Ms. Titus:    Below are the results from your recent visit:    Blood counts and iron levels normal  Kidney and liver function normal  Cholesterol mildly elevated, work on diet and exercise  A1c prediabetes range, work on diet        Resulted Orders   Ferritin   Result Value Ref Range    Ferritin 24 15 - 150 ng/mL   Iron Profile   Result Value Ref Range    TIBC 362 250 - 450 ug/dL    UIBC 299 131 - 425 ug/dL    Iron 63 27 - 159 ug/dL    Iron Saturation 17 15 - 55 %   Comprehensive Metabolic Panel   Result Value Ref Range    Glucose 133 (H) 70 - 99 mg/dL    BUN 9 6 - 24 mg/dL    Creatinine 0.89 0.57 - 1.00 mg/dL    EGFR Result 83 >59 mL/min/1.73    BUN/Creatinine Ratio 10 9 - 23    Sodium 141 134 - 144 mmol/L    Potassium 4.0 3.5 - 5.2 mmol/L    Chloride 104 96 - 106 mmol/L    Total CO2 21 20 - 29 mmol/L    Calcium 9.2 8.7 - 10.2 mg/dL    Total Protein 6.1 6.0 - 8.5 g/dL    Albumin 4.1 3.9 - 4.9 g/dL    Globulin 2.0 1.5 - 4.5 g/dL    Total Bilirubin 0.3 0.0 - 1.2 mg/dL    Alkaline Phosphatase 78 44 - 121 IU/L    AST (SGOT) 14 0 - 40 IU/L    ALT (SGPT) 12 0 - 32 IU/L   Lipid Panel   Result Value Ref Range    Total Cholesterol 190 100 - 199 mg/dL    Triglycerides 174 (H) 0 - 149 mg/dL    HDL Cholesterol 42 >39 mg/dL    VLDL Cholesterol Familia 31 5 - 40 mg/dL    LDL Chol Calc (NIH) 117 (H) 0 - 99 mg/dL   Hemoglobin A1c   Result Value Ref Range    Hemoglobin A1C 5.9 (H) 4.8 - 5.6 %      Comment:               Prediabetes: 5.7 - 6.4           Diabetes: >6.4           Glycemic control for adults with diabetes: <7.0     CBC (No Diff)   Result Value Ref Range    WBC 6.5 3.4 - 10.8 x10E3/uL    RBC 4.99 3.77 - 5.28 x10E6/uL    Hemoglobin 14.9 11.1 - 15.9 g/dL    Hematocrit 45.4 34.0 - 46.6 %    MCV 91 79 - 97 fL    MCH 29.9 26.6 - 33.0 pg    MCHC 32.8 31.5 - 35.7 g/dL    RDW 13.6 11.7 - 15.4 %    Platelets 256 150 - 450 x10E3/uL         If you have  any questions or concerns, please don't hesitate to call.         Sincerely,        Robles Milian, DO

## 2025-06-01 NOTE — PROGRESS NOTES
Mail copy of results to patient.  Blood counts and iron levels normal  Kidney and liver function normal  Cholesterol mildly elevated, work on diet and exercise  A1c prediabetes range, work on diet

## 2025-06-02 ENCOUNTER — OFFICE VISIT (OUTPATIENT)
Dept: FAMILY MEDICINE CLINIC | Facility: CLINIC | Age: 40
End: 2025-06-02
Payer: COMMERCIAL

## 2025-06-02 VITALS
BODY MASS INDEX: 51.89 KG/M2 | HEART RATE: 88 BPM | HEIGHT: 62 IN | WEIGHT: 282 LBS | OXYGEN SATURATION: 98 % | SYSTOLIC BLOOD PRESSURE: 120 MMHG | DIASTOLIC BLOOD PRESSURE: 82 MMHG

## 2025-06-02 DIAGNOSIS — L81.9 PIGMENTED SKIN LESION: ICD-10-CM

## 2025-06-02 DIAGNOSIS — S63.501A SPRAIN OF RIGHT WRIST, INITIAL ENCOUNTER: ICD-10-CM

## 2025-06-02 DIAGNOSIS — M54.12 CERVICAL RADICULOPATHY: Primary | ICD-10-CM

## 2025-06-02 RX ORDER — PREDNISONE 10 MG/1
TABLET ORAL
Qty: 32 TABLET | Refills: 0 | Status: SHIPPED | OUTPATIENT
Start: 2025-06-02

## 2025-06-02 RX ORDER — GABAPENTIN 300 MG/1
300 CAPSULE ORAL NIGHTLY
Qty: 30 CAPSULE | Refills: 3 | Status: SHIPPED | OUTPATIENT
Start: 2025-06-02

## 2025-06-02 NOTE — PROGRESS NOTES
Subjective   Giuliana Titus is a 39 y.o. female. Presents today for   Chief Complaint   Patient presents with    Neck Pain         History of Present Illness  History of Present Illness  The patient is a 39-year-old female who presents for evaluation of neck pain and cervical radiculopathy. Her last office visit was on 04/14/2025, during which she received trigger point injections. She reports that physical therapy had not been helpful in the past and was not referred for further sessions. Additionally, she was given a trial of NSAIDs and referred to pain management.    She reports persistent neck pain, which has not improved since her last visit. She has an upcoming appointment with pain management on 06/19/2025, but anticipates only undergoing a drug screen and paperwork without a consultation with the physician. The trigger point injections provided minimal relief immediately post-procedure. She has not previously tried gabapentin for pain management. She suspects a pinched nerve and typically avoids pain medication, relying on ibuprofen for pain management.    Approximately 2 weeks ago, she experienced a fall while ascending stairs, resulting in most of her weight being borne by her right wrist. This incident has exacerbated her neck pain and increased the frequency of her headaches. She reports mild tenderness in her wrist upon palpation but no numbness or tingling. The pain is not bothersome unless the area is touched or moved in certain ways.    She also reports a change in her birthmark, which has developed white, clear pustule-like lesions. She does not experience any pain associated with this change. She recalls a previous consultation with a dermatologist years ago, during which the birthmark was measured, but she did not follow up as advised.    She has lost 30 pounds. Her blood pressure has been well-controlled.  Review of Systems   Musculoskeletal:  Positive for arthralgias and neck pain.  "  Neurological:  Positive for numbness.       Patient Active Problem List   Diagnosis    Abnormal uterine bleeding    Depression    Left flank pain       Social History     Socioeconomic History    Marital status:    Tobacco Use    Smoking status: Never     Passive exposure: Never    Smokeless tobacco: Never   Vaping Use    Vaping status: Never Used   Substance and Sexual Activity    Alcohol use: Not Currently    Drug use: Never    Sexual activity: Yes     Partners: Male     Birth control/protection: None       No Known Allergies      Objective   Vitals:    06/02/25 1657   BP: 150/100   Pulse: 88   SpO2: 98%   Weight: 128 kg (282 lb)   Height: 157.5 cm (62\")     Body mass index is 51.58 kg/m².    Physical Exam  Vitals and nursing note reviewed.   Constitutional:       Appearance: Normal appearance. She is not toxic-appearing or diaphoretic.   HENT:      Head: Normocephalic and atraumatic.   Musculoskeletal:      Right wrist: Tenderness present.      Cervical back: Neck supple.   Skin:     General: Skin is warm and dry.      Capillary Refill: Capillary refill takes less than 2 seconds.      Comments: Left lateral eyebrow pigmented lesion   Neurological:      Mental Status: She is alert.   Psychiatric:         Mood and Affect: Mood normal.         Behavior: Behavior normal.       Physical Exam  Extremities: Tenderness in the center of the right wrist    Results  Imaging   - MRI cervical spine: 08/06/2023, Multilevel degenerative disease involving the cervical spine, most prominent at C5-6 where there is disk height loss, disk desiccation, and a right paracentral and right lateral broad-based disk herniation which abuts and mildly flattens the ventral surface of the cord laterally to the right. No evidence of cord signal abnormality.     Assessment & Plan   Diagnoses and all orders for this visit:    1. Cervical radiculopathy (Primary)  -     predniSONE (DELTASONE) 10 MG tablet; 6 tabs po qd x 2 days, 4 tabs po " qd x 2 days, 3 tabs po qd x 2 days, 2 tabs po qd x 2 days, 1 tab po qd x 2 days  Dispense: 32 tablet; Refill: 0  -     gabapentin (NEURONTIN) 300 MG capsule; Take 1 capsule by mouth Every Night.  Dispense: 30 capsule; Refill: 3    2. Sprain of right wrist, initial encounter  -     predniSONE (DELTASONE) 10 MG tablet; 6 tabs po qd x 2 days, 4 tabs po qd x 2 days, 3 tabs po qd x 2 days, 2 tabs po qd x 2 days, 1 tab po qd x 2 days  Dispense: 32 tablet; Refill: 0    3. Pigmented skin lesion  -     Ambulatory Referral to Dermatology           Assessment & Plan  1. Neck pain.  - The patient's neck pain persists despite previous interventions, including trigger point injections and NSAIDs.  - MRI cervical spine on 08/06/2023 shows multilevel degenerative disease, most prominent at C5-6 with disk height loss, disk desiccation, and right paracentral and lateral disk protrusion.  - Gabapentin will be started at bedtime to address potential nerve impingement and associated pain. If gabapentin proves ineffective or intolerable, she is advised to contact the office.  - A tapering steroid dose will be initiated to manage the neck pain. If the wrist does not fully recover, imaging may be considered.    2. Cervical radiculopathy.  - The patient reports increased headaches likely due to the pain from cervical radiculopathy.  - MRI cervical spine on 08/06/2023 shows multilevel degenerative disease, most prominent at C5-6 with disk height loss, disk desiccation, and right paracentral and lateral disk protrusion.  - Gabapentin will be started at bedtime to manage nerve pain. She is advised to keep the office updated on her progress with pain management.  - Referral to pain management is scheduled for 06/10/2025 and 06/19/2025.    3. Right wrist pain.  - The patient likely sustained a severe sprain or partial tear in the right wrist from a fall two weeks ago. The wrist is tender but shows signs of healing.  - Physical exam reveals  "tenderness in the center of the wrist, but no numbness or tingling.  - A tapering steroid dose will be initiated to help with inflammation.  - If the wrist does not fully recover, imaging may be considered.    4. Birthmark changes.  - The patient reports changes in her birthmark, including the appearance of white pustule-like lesions.  - Physical exam reveals no pain or darkening of the birthmark.  - A referral to dermatology will be made for further evaluation.  - The patient is advised to monitor for any further changes in the birthmark.          -Follow up: 2 to 3 months and prn     ________________________________________  Robles Milian DO, MS    Current Outpatient Medications on File Prior to Visit   Medication Sig Dispense Refill    albuterol sulfate  (90 Base) MCG/ACT inhaler Inhale 2 puffs Every 4 (Four) Hours As Needed for Wheezing or Shortness of Air. 6.7 g 12    ciclopirox (PENLAC) 8 % solution Apply  topically to the appropriate area as directed Every Night. On nailes 6 mL 12    cyanocobalamin 1000 MCG/ML injection INJECT 1 MILLILITER INTRAMUSCULARLY EVERY 30 DAYS AS DIRECTED 1 mL 3    diclofenac (VOLTAREN) 75 MG EC tablet Take 1 tablet by mouth 2 (Two) Times a Day. (Hold when go on steroid) 60 tablet 0    escitalopram (LEXAPRO) 10 MG tablet Take 1 tablet by mouth Daily.      famotidine (PEPCID) 20 MG tablet TAKE 1 TABLET BY MOUTH 2 TIMES A DAY 60 tablet 3    norethindrone (MICRONOR) 0.35 MG tablet Take 1 tablet by mouth Daily.      ondansetron (ZOFRAN) 8 MG tablet Take 1 tablet by mouth.      Syringe 25G X 1\" 3 ML misc IM monthly 3 each 1    valACYclovir (VALTREX) 1000 MG tablet Take 1 tablet by mouth Daily.       No current facility-administered medications on file prior to visit.       "

## 2025-07-03 RX ORDER — METHOCARBAMOL 750 MG/1
750 TABLET, FILM COATED ORAL 3 TIMES DAILY PRN
Qty: 45 TABLET | Refills: 0 | Status: SHIPPED | OUTPATIENT
Start: 2025-07-03

## 2025-07-03 RX ORDER — METHYLPREDNISOLONE 4 MG/1
TABLET ORAL
Qty: 21 TABLET | Refills: 0 | Status: SHIPPED | OUTPATIENT
Start: 2025-07-03

## 2025-08-13 DIAGNOSIS — M54.12 CERVICAL RADICULOPATHY: ICD-10-CM

## 2025-08-13 DIAGNOSIS — E53.8 B12 DEFICIENCY: ICD-10-CM

## 2025-08-13 RX ORDER — CYANOCOBALAMIN 1000 UG/ML
INJECTION, SOLUTION INTRAMUSCULAR; SUBCUTANEOUS
Qty: 1 ML | Refills: 3 | Status: SHIPPED | OUTPATIENT
Start: 2025-08-13

## 2025-08-13 RX ORDER — FAMOTIDINE 20 MG/1
20 TABLET, FILM COATED ORAL 2 TIMES DAILY
Qty: 60 TABLET | Refills: 3 | Status: SHIPPED | OUTPATIENT
Start: 2025-08-13